# Patient Record
Sex: FEMALE | Race: WHITE | NOT HISPANIC OR LATINO | Employment: OTHER | URBAN - METROPOLITAN AREA
[De-identification: names, ages, dates, MRNs, and addresses within clinical notes are randomized per-mention and may not be internally consistent; named-entity substitution may affect disease eponyms.]

---

## 2023-09-07 PROBLEM — H35.3230 BILATERAL EXUDATIVE AGE-RELATED MACULAR DEGENERATION (MULTI): Status: ACTIVE | Noted: 2023-09-07

## 2023-09-07 PROBLEM — M81.0 AGE-RELATED OSTEOPOROSIS WITHOUT CURRENT PATHOLOGICAL FRACTURE: Status: ACTIVE | Noted: 2023-09-07

## 2023-09-07 PROBLEM — K21.9 GASTROESOPHAGEAL REFLUX DISEASE WITHOUT ESOPHAGITIS: Status: ACTIVE | Noted: 2023-09-07

## 2023-09-07 PROBLEM — R73.03 PREDIABETES: Status: ACTIVE | Noted: 2023-09-07

## 2023-09-07 PROBLEM — I87.2 VENOUS INSUFFICIENCY: Status: ACTIVE | Noted: 2023-09-07

## 2023-09-07 PROBLEM — I50.9 CONGESTIVE HEART FAILURE (MULTI): Status: ACTIVE | Noted: 2023-09-07

## 2023-09-07 PROBLEM — Z99.2 DEPENDENCE ON RENAL DIALYSIS (CMS-HCC): Status: ACTIVE | Noted: 2023-09-07

## 2023-09-07 PROBLEM — E55.9 VITAMIN D DEFICIENCY: Status: ACTIVE | Noted: 2023-09-07

## 2023-09-07 PROBLEM — F41.9 ANXIETY: Status: ACTIVE | Noted: 2023-09-07

## 2023-09-07 PROBLEM — I10 BENIGN ESSENTIAL HYPERTENSION: Status: ACTIVE | Noted: 2023-09-07

## 2023-09-07 PROBLEM — M16.0 PRIMARY OSTEOARTHRITIS OF BOTH HIPS: Status: ACTIVE | Noted: 2023-09-07

## 2023-09-07 PROBLEM — D69.1 PLATELET DISORDER (MULTI): Status: ACTIVE | Noted: 2023-09-07

## 2023-09-07 PROBLEM — N18.9 ANEMIA SECONDARY TO RENAL FAILURE: Status: ACTIVE | Noted: 2023-09-07

## 2023-09-07 PROBLEM — D69.6 ACQUIRED THROMBOCYTOPENIA (CMS-HCC): Status: ACTIVE | Noted: 2023-09-07

## 2023-09-07 PROBLEM — D63.1 ANEMIA SECONDARY TO RENAL FAILURE: Status: ACTIVE | Noted: 2023-09-07

## 2023-09-07 PROBLEM — I25.10 CORONARY ARTERY DISEASE INVOLVING NATIVE CORONARY ARTERY OF NATIVE HEART WITHOUT ANGINA PECTORIS: Status: ACTIVE | Noted: 2023-09-07

## 2023-09-07 PROBLEM — E78.00 HYPERCHOLESTEROLEMIA: Status: ACTIVE | Noted: 2023-09-07

## 2023-09-07 PROBLEM — E66.9 OBESITY WITH BODY MASS INDEX 30 OR GREATER: Status: ACTIVE | Noted: 2023-09-07

## 2023-09-07 PROBLEM — I48.20 CHRONIC ATRIAL FIBRILLATION (MULTI): Status: ACTIVE | Noted: 2023-09-07

## 2023-09-07 PROBLEM — F32.A DEPRESSION: Status: ACTIVE | Noted: 2023-09-07

## 2023-09-07 PROBLEM — G47.33 OBSTRUCTIVE SLEEP APNEA SYNDROME: Status: ACTIVE | Noted: 2023-09-07

## 2023-09-07 PROBLEM — E03.9 ACQUIRED HYPOTHYROIDISM: Status: ACTIVE | Noted: 2023-09-07

## 2023-09-07 PROBLEM — J44.9 CHRONIC OBSTRUCTIVE LUNG DISEASE (MULTI): Status: ACTIVE | Noted: 2023-09-07

## 2023-09-07 PROBLEM — M10.9 ACUTE GOUT OF RIGHT FOOT: Status: ACTIVE | Noted: 2023-09-07

## 2023-09-07 PROBLEM — E78.5 DYSLIPIDEMIA: Status: ACTIVE | Noted: 2023-09-07

## 2023-09-07 PROBLEM — I71.40 ABDOMINAL AORTIC ANEURYSM (AAA) WITHOUT RUPTURE (CMS-HCC): Status: ACTIVE | Noted: 2023-09-07

## 2023-09-07 RX ORDER — AMIODARONE HYDROCHLORIDE 200 MG/1
200 TABLET ORAL DAILY
COMMUNITY
End: 2023-10-30

## 2023-09-07 RX ORDER — PRAVASTATIN SODIUM 10 MG/1
TABLET ORAL
COMMUNITY
Start: 2016-06-06 | End: 2023-11-29 | Stop reason: SDUPTHER

## 2023-09-07 RX ORDER — MULTIVITAMIN/IRON/FOLIC ACID 18MG-0.4MG
TABLET ORAL
COMMUNITY
End: 2023-11-29 | Stop reason: ALTCHOICE

## 2023-09-07 RX ORDER — LEVOTHYROXINE SODIUM 125 UG/1
112 TABLET ORAL
COMMUNITY
End: 2024-01-09 | Stop reason: WASHOUT

## 2023-09-07 RX ORDER — AMLODIPINE BESYLATE 5 MG/1
5 TABLET ORAL DAILY
COMMUNITY
End: 2023-11-29 | Stop reason: ALTCHOICE

## 2023-09-07 RX ORDER — FUROSEMIDE 20 MG/1
20 TABLET ORAL DAILY
COMMUNITY
Start: 2022-05-27 | End: 2023-11-29 | Stop reason: SINTOL

## 2023-09-07 RX ORDER — METOPROLOL SUCCINATE 25 MG/1
TABLET, EXTENDED RELEASE ORAL
COMMUNITY
Start: 2016-07-01 | End: 2023-11-29 | Stop reason: SDUPTHER

## 2023-09-07 RX ORDER — TRIAMTERENE/HYDROCHLOROTHIAZID 37.5-25 MG
TABLET ORAL
COMMUNITY
Start: 2016-03-10 | End: 2023-11-29 | Stop reason: SINTOL

## 2023-09-07 RX ORDER — GABAPENTIN 100 MG/1
100 CAPSULE ORAL NIGHTLY
COMMUNITY
Start: 2016-09-24 | End: 2023-11-29

## 2023-09-07 RX ORDER — ATORVASTATIN CALCIUM 80 MG/1
80 TABLET, FILM COATED ORAL DAILY
COMMUNITY
Start: 2021-10-12 | End: 2023-11-29 | Stop reason: ENTERED-IN-ERROR

## 2023-09-07 RX ORDER — CHOLECALCIFEROL (VITAMIN D3) 50 MCG
2000 TABLET ORAL DAILY
COMMUNITY
End: 2023-11-29 | Stop reason: ALTCHOICE

## 2023-09-07 RX ORDER — PROMETHAZINE HYDROCHLORIDE 12.5 MG/1
12.5 TABLET ORAL EVERY 6 HOURS PRN
COMMUNITY
End: 2023-11-29 | Stop reason: ALTCHOICE

## 2023-09-07 RX ORDER — UMECLIDINIUM 62.5 UG/1
1 AEROSOL, POWDER ORAL DAILY
COMMUNITY
End: 2023-11-29 | Stop reason: ALTCHOICE

## 2023-09-07 RX ORDER — WARFARIN 2.5 MG/1
TABLET ORAL
COMMUNITY
End: 2023-11-29

## 2023-09-07 RX ORDER — METOPROLOL TARTRATE 50 MG/1
50 TABLET ORAL 2 TIMES DAILY
COMMUNITY
Start: 2023-02-05 | End: 2024-02-22 | Stop reason: SDUPTHER

## 2023-09-07 RX ORDER — ATORVASTATIN CALCIUM 40 MG/1
40 TABLET, FILM COATED ORAL DAILY
COMMUNITY
End: 2023-11-29 | Stop reason: ENTERED-IN-ERROR

## 2023-09-07 RX ORDER — NAPROXEN SODIUM 220 MG/1
81 TABLET, FILM COATED ORAL DAILY
COMMUNITY
End: 2023-11-29 | Stop reason: ENTERED-IN-ERROR

## 2023-09-07 RX ORDER — SERTRALINE HYDROCHLORIDE 25 MG/1
25 TABLET, FILM COATED ORAL
COMMUNITY
Start: 2016-06-06 | End: 2023-11-29 | Stop reason: ALTCHOICE

## 2023-09-07 RX ORDER — LEVOTHYROXINE SODIUM 50 UG/1
TABLET ORAL
COMMUNITY
Start: 2016-06-06 | End: 2023-11-29

## 2023-09-07 RX ORDER — ATENOLOL 50 MG/1
TABLET ORAL
COMMUNITY
Start: 2023-01-08 | End: 2023-11-29 | Stop reason: ENTERED-IN-ERROR

## 2023-09-07 RX ORDER — LISINOPRIL 5 MG/1
5 TABLET ORAL DAILY
COMMUNITY
Start: 2016-08-25 | End: 2023-11-29 | Stop reason: SINTOL

## 2023-09-19 ENCOUNTER — DOCUMENTATION (OUTPATIENT)
Dept: CARE COORDINATION | Facility: CLINIC | Age: 83
End: 2023-09-19
Payer: MEDICARE

## 2023-09-22 ENCOUNTER — PATIENT OUTREACH (OUTPATIENT)
Dept: CARE COORDINATION | Facility: CLINIC | Age: 83
End: 2023-09-22
Payer: MEDICARE

## 2023-10-02 ENCOUNTER — DOCUMENTATION (OUTPATIENT)
Dept: PRIMARY CARE | Facility: CLINIC | Age: 83
End: 2023-10-02
Payer: MEDICARE

## 2023-10-02 NOTE — PROGRESS NOTES
"On 9/22/23: My note: \"email from ROB HERRERA: \"Sruthi,         I spoke with Nicki, and she mentioned that maybe you or Yumiko are working on getting her mother on the Medicaid waiver. Does that sound correct to you? I don't want to duplicate efforts, so I told Nicki I would check with you first, because if you aren't already looking into this for her mom, I'd like to get Nicki to connect with an elder law  I know who specializes in getting someone on the waiver.         Thanks,         Rob\"         My Response: \"Hi Rob,         I only connect patient's to resources.         I am not getting her connected to Medicaid Waiver at this time. I do however refer to to an agency such as [facility] which has Medicaid Waiver.         When I refer to something such as [employer]...I allow that agency to assist with Medicaid Waiver.         I did reach out to [employer] at one point for this patient. I am not certain if they assessed her for the Medicaid Waiver Services in the home. All I do is simply refer the patient to that agency.         I believe what you have in mind is very helpful.         Keep me posted and thank you so much for your assistance at this time with this patient. \"         Sruthi Franco MSW LSW.\"      Above is this conversation between this SW and Rob HERRERA/[facility].   Rob HERRERA is assisting this patient in connecting with community resource currently.  Rob works for [facility] and is a community resource for this SW to refer.      "

## 2023-10-04 ENCOUNTER — HOSPITAL ENCOUNTER (EMERGENCY)
Facility: HOSPITAL | Age: 83
Discharge: HOME | End: 2023-10-04
Attending: EMERGENCY MEDICINE
Payer: MEDICARE

## 2023-10-04 ENCOUNTER — APPOINTMENT (OUTPATIENT)
Dept: RADIOLOGY | Facility: HOSPITAL | Age: 83
End: 2023-10-04
Payer: MEDICARE

## 2023-10-04 VITALS
HEIGHT: 62 IN | HEART RATE: 90 BPM | WEIGHT: 177 LBS | SYSTOLIC BLOOD PRESSURE: 158 MMHG | OXYGEN SATURATION: 99 % | RESPIRATION RATE: 17 BRPM | TEMPERATURE: 97.5 F | DIASTOLIC BLOOD PRESSURE: 80 MMHG | BODY MASS INDEX: 32.57 KG/M2

## 2023-10-04 DIAGNOSIS — R25.1 TREMOR: Primary | ICD-10-CM

## 2023-10-04 LAB
ALBUMIN SERPL-MCNC: 3.4 G/DL (ref 3.5–5)
ALP BLD-CCNC: 69 U/L (ref 35–125)
ALT SERPL-CCNC: 21 U/L (ref 5–40)
ANION GAP SERPL CALC-SCNC: 9 MMOL/L
APPEARANCE UR: ABNORMAL
AST SERPL-CCNC: 32 U/L (ref 5–40)
BACTERIA #/AREA URNS AUTO: ABNORMAL /HPF
BASOPHILS # BLD AUTO: 0.08 X10*3/UL (ref 0–0.1)
BASOPHILS NFR BLD AUTO: 1.4 %
BILIRUB SERPL-MCNC: 0.6 MG/DL (ref 0.1–1.2)
BILIRUB UR STRIP.AUTO-MCNC: NEGATIVE MG/DL
BUN SERPL-MCNC: 25 MG/DL (ref 8–25)
CALCIUM SERPL-MCNC: 9.1 MG/DL (ref 8.5–10.4)
CHLORIDE SERPL-SCNC: 98 MMOL/L (ref 97–107)
CO2 SERPL-SCNC: 26 MMOL/L (ref 24–31)
COLOR UR: YELLOW
CREAT SERPL-MCNC: 5 MG/DL (ref 0.4–1.6)
EOSINOPHIL # BLD AUTO: 0.29 X10*3/UL (ref 0–0.4)
EOSINOPHIL NFR BLD AUTO: 5 %
ERYTHROCYTE [DISTWIDTH] IN BLOOD BY AUTOMATED COUNT: 15.1 % (ref 11.5–14.5)
GFR SERPL CREATININE-BSD FRML MDRD: 8 ML/MIN/1.73M*2
GLUCOSE SERPL-MCNC: 82 MG/DL (ref 65–99)
GLUCOSE UR STRIP.AUTO-MCNC: NORMAL MG/DL
HCT VFR BLD AUTO: 36.5 % (ref 36–46)
HGB BLD-MCNC: 11.7 G/DL (ref 12–16)
IMM GRANULOCYTES # BLD AUTO: 0.01 X10*3/UL (ref 0–0.5)
IMM GRANULOCYTES NFR BLD AUTO: 0.2 % (ref 0–0.9)
KETONES UR STRIP.AUTO-MCNC: ABNORMAL MG/DL
LEUKOCYTE ESTERASE UR QL STRIP.AUTO: ABNORMAL
LYMPHOCYTES # BLD AUTO: 2.05 X10*3/UL (ref 0.8–3)
LYMPHOCYTES NFR BLD AUTO: 35.7 %
MCH RBC QN AUTO: 29.5 PG (ref 26–34)
MCHC RBC AUTO-ENTMCNC: 32.1 G/DL (ref 32–36)
MCV RBC AUTO: 92 FL (ref 80–100)
MONOCYTES # BLD AUTO: 0.31 X10*3/UL (ref 0.05–0.8)
MONOCYTES NFR BLD AUTO: 5.4 %
NEUTROPHILS # BLD AUTO: 3.01 X10*3/UL (ref 1.6–5.5)
NEUTROPHILS NFR BLD AUTO: 52.3 %
NITRITE UR QL STRIP.AUTO: NEGATIVE
NRBC BLD-RTO: 0 /100 WBCS (ref 0–0)
PH UR STRIP.AUTO: 7 [PH]
PLATELET # BLD AUTO: 57 X10*3/UL (ref 150–450)
PMV BLD AUTO: 11.8 FL (ref 7.5–11.5)
POTASSIUM SERPL-SCNC: 4.7 MMOL/L (ref 3.4–5.1)
PROT SERPL-MCNC: 6.9 G/DL (ref 5.9–7.9)
PROT UR STRIP.AUTO-MCNC: ABNORMAL MG/DL
RBC # BLD AUTO: 3.96 X10*6/UL (ref 4–5.2)
RBC # UR STRIP.AUTO: ABNORMAL /UL
RBC #/AREA URNS AUTO: ABNORMAL /HPF
RBC MORPH BLD: NORMAL
SODIUM SERPL-SCNC: 133 MMOL/L (ref 133–145)
SP GR UR STRIP.AUTO: 1.01
SQUAMOUS #/AREA URNS AUTO: ABNORMAL /HPF
TARGETS BLD QL SMEAR: NORMAL
UROBILINOGEN UR STRIP.AUTO-MCNC: NORMAL MG/DL
WBC # BLD AUTO: 5.8 X10*3/UL (ref 4.4–11.3)
WBC #/AREA URNS AUTO: >50 /HPF

## 2023-10-04 PROCEDURE — 84075 ASSAY ALKALINE PHOSPHATASE: CPT

## 2023-10-04 PROCEDURE — 36415 COLL VENOUS BLD VENIPUNCTURE: CPT

## 2023-10-04 PROCEDURE — 71046 X-RAY EXAM CHEST 2 VIEWS: CPT

## 2023-10-04 PROCEDURE — 81001 URINALYSIS AUTO W/SCOPE: CPT

## 2023-10-04 PROCEDURE — 99284 EMERGENCY DEPT VISIT MOD MDM: CPT | Mod: 25 | Performed by: EMERGENCY MEDICINE

## 2023-10-04 PROCEDURE — 85025 COMPLETE CBC W/AUTO DIFF WBC: CPT

## 2023-10-04 ASSESSMENT — PAIN DESCRIPTION - FREQUENCY: FREQUENCY: CONSTANT/CONTINUOUS

## 2023-10-04 ASSESSMENT — COLUMBIA-SUICIDE SEVERITY RATING SCALE - C-SSRS
2. HAVE YOU ACTUALLY HAD ANY THOUGHTS OF KILLING YOURSELF?: NO
1. IN THE PAST MONTH, HAVE YOU WISHED YOU WERE DEAD OR WISHED YOU COULD GO TO SLEEP AND NOT WAKE UP?: NO
6. HAVE YOU EVER DONE ANYTHING, STARTED TO DO ANYTHING, OR PREPARED TO DO ANYTHING TO END YOUR LIFE?: NO

## 2023-10-04 ASSESSMENT — PAIN SCALES - GENERAL: PAINLEVEL_OUTOF10: 7

## 2023-10-04 ASSESSMENT — LIFESTYLE VARIABLES
EVER HAD A DRINK FIRST THING IN THE MORNING TO STEADY YOUR NERVES TO GET RID OF A HANGOVER: NO
EVER FELT BAD OR GUILTY ABOUT YOUR DRINKING: NO
HAVE YOU EVER FELT YOU SHOULD CUT DOWN ON YOUR DRINKING: NO
HAVE PEOPLE ANNOYED YOU BY CRITICIZING YOUR DRINKING: NO

## 2023-10-04 ASSESSMENT — PAIN - FUNCTIONAL ASSESSMENT
PAIN_FUNCTIONAL_ASSESSMENT: 0-10
PAIN_FUNCTIONAL_ASSESSMENT: 0-10

## 2023-10-04 ASSESSMENT — PAIN DESCRIPTION - DESCRIPTORS: DESCRIPTORS: ACHING

## 2023-10-04 ASSESSMENT — PAIN DESCRIPTION - LOCATION: LOCATION: LEG

## 2023-10-04 NOTE — ED NOTES
Vitals updated. Pt updated. [place] given. No needs at this time     Leticia Culver, EMT  10/04/23 7756

## 2023-10-04 NOTE — ED PROVIDER NOTES
HPI   Chief Complaint   Patient presents with    Tremors     Tremors in both lower ext starting this morning. PT has upper body tremors chronically.        Patient is an 82-year-old female presents emergency department for evaluation of tremors and generalized weakness.  Patient states that for the last 4 months she has had tremors in the upper extremity and has seen her primary care doctor for this and was told they were benign.  She states that starting today she has felt tremulous in the lower extremities as well and generally weak.  She is nonambulatory at baseline and uses a walker to get around.  Her main complaint today is just feeling generally fatigued and worn out.  She does note that she is a dialysis patient with dialysis on Mondays, Wednesdays, and Fridays missing her dialysis today to come here for her general fatigue. she denies any specific complaints at this time of any pain, chest pain, shortness of breath, nausea, vomiting, fevers, chills, or other symptoms at this time.                          Lizeth Coma Scale Score: 15                  Patient History   History reviewed. No pertinent past medical history.  History reviewed. No pertinent surgical history.  Family History   Problem Relation Name Age of Onset    Heart disease Mother      Diabetes Mother      Emphysema Father      COPD Father       Social History     Tobacco Use    Smoking status: Not on file    Smokeless tobacco: Not on file   Substance Use Topics    Alcohol use: Not on file    Drug use: Not on file       Physical Exam   ED Triage Vitals [10/04/23 1059]   Temp Heart Rate Resp BP   36.4 °C (97.5 °F) 61 16 --      SpO2 Temp Source Heart Rate Source Patient Position   92 % Oral Monitor --      BP Location FiO2 (%)     -- --       Physical Exam  Constitutional:       Appearance: Normal appearance.   HENT:      Head: Normocephalic and atraumatic.   Eyes:      Extraocular Movements: Extraocular movements intact.      Pupils: Pupils  are equal, round, and reactive to light.   Cardiovascular:      Rate and Rhythm: Normal rate and regular rhythm.   Pulmonary:      Effort: Pulmonary effort is normal.      Breath sounds: Normal breath sounds.   Abdominal:      General: Abdomen is flat.      Palpations: Abdomen is soft.   Musculoskeletal:         General: Normal range of motion.      Cervical back: Normal range of motion and neck supple.   Skin:     General: Skin is warm and dry.   Neurological:      General: No focal deficit present.      Mental Status: She is alert and oriented to person, place, and time.         ED Course & MDM   Diagnoses as of 10/04/23 1906   Tremor       Medical Decision Making  Patient is an 82-year-old female presents emergency department for evaluation of generalized fatigue and tremors.    EKG was interpreted by attending physician.    Lab work done today included CMP, CBC, urinalysis.  Lab work without significant abnormality.    Scans done today were interpreted/confirmed by radiologist and also interpreted by me which included chest x-ray.  Chest x-ray shows no acute cardiopulmonary disease.    Medications given at today's visit.    I saw this patient in conjunction with Dr. Kaiser.  Patient had no appreciable tremors or weakness noted on physical exam.  Lab work without significant abnormality and unclear etiology of patient's symptoms.  Patient is currently at her baseline able to take care of himself at home.  She is most concerned with her tremors which have been ongoing for the last several months.  She is already following with her primary care provider and has been told that these are benign essential tremors and did not require any further intervention.  Given her concern she will be given referral to neurology for outpatient follow-up.  Given unremarkable work-up will discharge to follow-up close with primary care provider outpatient.  Emergent pathologies were considered for this patient, although I have low  suspicion for anything acutely emergent given patient's clinical presentation, history, physical exam, stable vital signs, and relatively unremarkable workup.  Discharging patient home is reasonable plan of care for outpatient management.    All labs, imaging, and diagnostic studies were reviewed by me and patient was counseled on clinical impression, expectations, and plan.  Patient was educated to follow-up with PCP in the following 1-2 days.  All questions from patient were answered. They elicited understanding and were agreeable to course of treatment.  Patient was discharged in stable condition and given strict return precautions.    ** Disclaimer:  Parts of this document were written utilizing a voice to text dictation software.  Note may contain minor transcription or typographical errors that were inadvertently transcribed by the computer software.        For complete history of present illness, review of systems, and physical exam information please see the mid-level provider full encounter documentation.    Patient is an 82-year-old female presenting with complaints of intermittent tremors.  Has been ongoing for months and she has seen her primary care physician for same.  He reports her feet seem to be getting involved in the tremulousness now.  Physical exam was unremarkable, patient had concern for a minimal tremor in her right index finger during my examination.  Work-up was unrevealing for an etiology.  Patient will be discharged with follow-up back with her primary care physician.    I personally saw the patient and performed a substantive portion of the visit including all aspects of the medical decision making.    Procedure  Procedures     Emi Navas PA-C  10/04/23 1905       Gama Kaiser MD  10/05/23 9110

## 2023-10-09 ENCOUNTER — HOSPITAL ENCOUNTER (OUTPATIENT)
Dept: CARDIOLOGY | Facility: CLINIC | Age: 83
Discharge: HOME | End: 2023-10-09
Payer: MEDICARE

## 2023-10-09 ENCOUNTER — APPOINTMENT (OUTPATIENT)
Dept: CARDIOLOGY | Facility: CLINIC | Age: 83
End: 2023-10-09
Payer: MEDICARE

## 2023-10-09 PROCEDURE — 93296 REM INTERROG EVL PM/IDS: CPT

## 2023-10-09 PROCEDURE — 93294 REM INTERROG EVL PM/LDLS PM: CPT | Performed by: INTERNAL MEDICINE

## 2023-10-13 ENCOUNTER — DOCUMENTATION (OUTPATIENT)
Dept: CARE COORDINATION | Facility: CLINIC | Age: 83
End: 2023-10-13
Payer: MEDICARE

## 2023-10-13 NOTE — PROGRESS NOTES
"Email from Tomasz HERRERA/. [facility]:  \"I've been leaving voicemails and texts for Nicki to discuss where things stand with the Medicaid waiver application for her mother, but I haven't heard back since September 29th.  I'm going to keep trying, but I wanted to give you a heads up\"    This SW will keep in contact with this resource on any updates.  Sruthi Franco MSW LSW   "

## 2023-10-18 ENCOUNTER — HOSPITAL ENCOUNTER (OUTPATIENT)
Dept: CARDIOLOGY | Facility: HOSPITAL | Age: 83
Discharge: HOME | End: 2023-10-18
Payer: MEDICARE

## 2023-10-18 LAB
ATRIAL RATE: 500 BPM
Q ONSET: 225 MS
QRS COUNT: 10 BEATS
QRS DURATION: 142 MS
QT INTERVAL: 522 MS
QTC CALCULATION(BAZETT): 522 MS
QTC FREDERICIA: 522 MS
R AXIS: 19 DEGREES
T AXIS: -3 DEGREES
T OFFSET: 486 MS
VENTRICULAR RATE: 60 BPM

## 2023-10-18 PROCEDURE — 93005 ELECTROCARDIOGRAM TRACING: CPT

## 2023-10-26 DIAGNOSIS — I48.0 PAROXYSMAL ATRIAL FIBRILLATION (MULTI): Primary | ICD-10-CM

## 2023-10-30 RX ORDER — AMIODARONE HYDROCHLORIDE 200 MG/1
200 TABLET ORAL DAILY
Qty: 90 TABLET | Refills: 0 | Status: SHIPPED | OUTPATIENT
Start: 2023-10-30 | End: 2023-10-31 | Stop reason: SDUPTHER

## 2023-10-31 DIAGNOSIS — I48.0 PAROXYSMAL ATRIAL FIBRILLATION (MULTI): ICD-10-CM

## 2023-10-31 RX ORDER — AMIODARONE HYDROCHLORIDE 200 MG/1
200 TABLET ORAL DAILY
Qty: 90 TABLET | Refills: 0 | Status: SHIPPED | OUTPATIENT
Start: 2023-10-31 | End: 2024-02-22 | Stop reason: SDUPTHER

## 2023-11-02 ENCOUNTER — DOCUMENTATION (OUTPATIENT)
Dept: CARE COORDINATION | Facility: CLINIC | Age: 83
End: 2023-11-02
Payer: MEDICARE

## 2023-11-02 NOTE — PROGRESS NOTES
This SW did call Nicki @ [phone]/daughter of the patient for an update.  This SW has left a message and provided new contact number too for this .   I am waiting for a return call from patient's daughter for any updates at this time.

## 2023-11-06 ENCOUNTER — DOCUMENTATION (OUTPATIENT)
Dept: PRIMARY CARE | Facility: CLINIC | Age: 83
End: 2023-11-06
Payer: MEDICARE

## 2023-11-06 DIAGNOSIS — I50.9 CONGESTIVE HEART FAILURE, UNSPECIFIED HF CHRONICITY, UNSPECIFIED HEART FAILURE TYPE (MULTI): ICD-10-CM

## 2023-11-06 DIAGNOSIS — N18.6 CHRONIC KIDNEY DISEASE WITH END STAGE RENAL FAILURE ON DIALYSIS (MULTI): ICD-10-CM

## 2023-11-06 DIAGNOSIS — Z99.2 CHRONIC KIDNEY DISEASE WITH END STAGE RENAL FAILURE ON DIALYSIS (MULTI): ICD-10-CM

## 2023-11-06 NOTE — PROGRESS NOTES
Attempted to reach patient's daughter Nicki Man for monthly outreach & left voicemail message with request to return my call. Patient being followed by Social Work as well. Will await response & attempt to contact within the week.

## 2023-11-07 NOTE — PROGRESS NOTES
Daughter Nicki returned my call & states has appointment with [facility] on Thursday, 11/9 at 1:30 pm for additional resources. Has not been successful getting Noemi qualified for Medicaid due to monthly income & has name of  to assist with Medicaid eligibility. Refer to notes from  Sruthi Franco for additional information. Interested in getting hospital bed, wheelchair & other assistive devices. Nicki to contact this  after Hospice visit to provide update. Patient continues to receive dialysis 3x/week at [facility]. Needs assistance with ADL's & this  will provide community referrals available as needed. Per Nicki, goal is to keep mom at home if possible as long as it is safe. Daughter does work outside the house & mom is alone for short periods of time. Will await update from daughter after Hospice visit.

## 2023-11-10 ENCOUNTER — TELEPHONE (OUTPATIENT)
Dept: PRIMARY CARE | Facility: CLINIC | Age: 83
End: 2023-11-10
Payer: MEDICARE

## 2023-11-10 NOTE — TELEPHONE ENCOUNTER
Spoke with Nicki. Agreed with PCP recommendation and will getting working on getting her to ER for geropsych eval and long term care placement

## 2023-11-10 NOTE — TELEPHONE ENCOUNTER
LVM for Nicki to call the office to discuss what PCP recommends. Did not want to leave this on a voicemail.

## 2023-11-10 NOTE — TELEPHONE ENCOUNTER
Patient's daughter Nicki called regarding some behavorial issues that the patient is having. Had initially asked about evaluation for stroke or dementia. States that patient is having increased issues with anger, calling 911 with no real concerns happening. States she can not keep taking care of her at home, patient not getting out of bed, only doing dialysis when she feels like it. Asking for what PCP could recommended for getting the patient help. Call back # [phone].

## 2023-11-13 ENCOUNTER — DOCUMENTATION (OUTPATIENT)
Dept: CARE COORDINATION | Facility: CLINIC | Age: 83
End: 2023-11-13
Payer: MEDICARE

## 2023-11-13 ENCOUNTER — OFFICE VISIT (OUTPATIENT)
Dept: VASCULAR SURGERY | Facility: CLINIC | Age: 83
End: 2023-11-13
Payer: MEDICARE

## 2023-11-13 VITALS — SYSTOLIC BLOOD PRESSURE: 128 MMHG | HEART RATE: 72 BPM | DIASTOLIC BLOOD PRESSURE: 56 MMHG

## 2023-11-13 DIAGNOSIS — N18.6 CHRONIC KIDNEY DISEASE WITH END STAGE RENAL DISEASE ON DIALYSIS DUE TO TYPE 2 DIABETES MELLITUS (MULTI): ICD-10-CM

## 2023-11-13 DIAGNOSIS — E11.22 CHRONIC KIDNEY DISEASE WITH END STAGE RENAL DISEASE ON DIALYSIS DUE TO TYPE 2 DIABETES MELLITUS (MULTI): ICD-10-CM

## 2023-11-13 DIAGNOSIS — I77.89 ARTERIAL STEAL SYNDROME (CMS-HCC): Primary | ICD-10-CM

## 2023-11-13 DIAGNOSIS — R09.89 OTHER SPECIFIED SYMPTOMS AND SIGNS INVOLVING THE CIRCULATORY AND RESPIRATORY SYSTEMS: ICD-10-CM

## 2023-11-13 DIAGNOSIS — N18.6 END STAGE KIDNEY DISEASE (MULTI): ICD-10-CM

## 2023-11-13 DIAGNOSIS — Z99.2 CHRONIC KIDNEY DISEASE WITH END STAGE RENAL DISEASE ON DIALYSIS DUE TO TYPE 2 DIABETES MELLITUS (MULTI): ICD-10-CM

## 2023-11-13 PROCEDURE — 1159F MED LIST DOCD IN RCRD: CPT | Performed by: NURSE PRACTITIONER

## 2023-11-13 PROCEDURE — 3074F SYST BP LT 130 MM HG: CPT | Performed by: NURSE PRACTITIONER

## 2023-11-13 PROCEDURE — 99213 OFFICE O/P EST LOW 20 MIN: CPT | Performed by: NURSE PRACTITIONER

## 2023-11-13 PROCEDURE — 1160F RVW MEDS BY RX/DR IN RCRD: CPT | Performed by: NURSE PRACTITIONER

## 2023-11-13 PROCEDURE — 99203 OFFICE O/P NEW LOW 30 MIN: CPT | Performed by: NURSE PRACTITIONER

## 2023-11-13 PROCEDURE — 3078F DIAST BP <80 MM HG: CPT | Performed by: NURSE PRACTITIONER

## 2023-11-13 PROCEDURE — 1125F AMNT PAIN NOTED PAIN PRSNT: CPT | Performed by: NURSE PRACTITIONER

## 2023-11-13 ASSESSMENT — PATIENT HEALTH QUESTIONNAIRE - PHQ9
1. LITTLE INTEREST OR PLEASURE IN DOING THINGS: NOT AT ALL
2. FEELING DOWN, DEPRESSED OR HOPELESS: NOT AT ALL
SUM OF ALL RESPONSES TO PHQ9 QUESTIONS 1 & 2: 0

## 2023-11-13 ASSESSMENT — COLUMBIA-SUICIDE SEVERITY RATING SCALE - C-SSRS
1. IN THE PAST MONTH, HAVE YOU WISHED YOU WERE DEAD OR WISHED YOU COULD GO TO SLEEP AND NOT WAKE UP?: NO
2. HAVE YOU ACTUALLY HAD ANY THOUGHTS OF KILLING YOURSELF?: NO
6. HAVE YOU EVER DONE ANYTHING, STARTED TO DO ANYTHING, OR PREPARED TO DO ANYTHING TO END YOUR LIFE?: NO

## 2023-11-13 ASSESSMENT — LIFESTYLE VARIABLES
SKIP TO QUESTIONS 9-10: 1
HOW OFTEN DO YOU HAVE A DRINK CONTAINING ALCOHOL: NEVER
HOW OFTEN DO YOU HAVE SIX OR MORE DRINKS ON ONE OCCASION: NEVER
AUDIT-C TOTAL SCORE: 0
HOW MANY STANDARD DRINKS CONTAINING ALCOHOL DO YOU HAVE ON A TYPICAL DAY: PATIENT DOES NOT DRINK

## 2023-11-13 ASSESSMENT — ENCOUNTER SYMPTOMS
LOSS OF SENSATION IN FEET: 0
DEPRESSION: 1
OCCASIONAL FEELINGS OF UNSTEADINESS: 1

## 2023-11-13 ASSESSMENT — PAIN SCALES - GENERAL: PAINLEVEL: 8

## 2023-11-13 NOTE — CARE PLAN
This SW needs to speak with daughter and or patient for update on care plan and goals.   Problem: Coordination of Community Resources Needed  Goal: Coordination of Services will be Obtained  Outcome: Not met  Intervention: Coordinate care to local community resources

## 2023-11-13 NOTE — PROGRESS NOTES
This SW spoke with LPN/Yumiko Smith today regarding this patient/Noemi. This SW has discussed possible level of care assessment option that can be completed by [facility] due to LPN explaining that the daughter/Nicki may want to place this patient. Nicki's number is: [phone]. At this time, this SW has left Nicki a message to discuss any options that could assist this patient and daughter.   NOTE: this SW also reviewed Pt's chart.

## 2023-11-16 NOTE — PROGRESS NOTES
"History Of Present Illness  Noemi Man is a 82 y.o. female presenting for evaluation of her left brachiobasilic arteriovenous fistula.  She initially had the fistula created January 23, 2023 by Dr. Varela.  The fistula was then transposed 4/19/2023.  The patient states that they attempted to utilize the fistula 1 time and it \"blood all over\".  Since then, they have not attempted to access the fistula again.  She is getting dialysis through a tunneled chest permacatheter.  Additionally, the patient notes symptoms of left hand pain and numbness.  States the symptoms have been getting worse.     Past Medical History  Patient Active Problem List    Diagnosis Date Noted    End stage renal disease (CMS/HCC) 11/13/2023    Acquired hypothyroidism 09/07/2023    Acquired thrombocytopenia (CMS/[facility]) 09/07/2023    Acute gout of right foot 09/07/2023    Chronic obstructive lung disease (CMS/[facility]) 09/07/2023    Age-related osteoporosis without current pathological fracture 09/07/2023    Abdominal aortic aneurysm (AAA) without rupture (CMS/HCC) 09/07/2023    Anxiety 09/07/2023    Congestive heart failure (CMS/[facility]) 09/07/2023    Coronary artery disease involving native coronary artery of native heart without angina pectoris 09/07/2023    Dependence on renal dialysis (CMS/[facility]) 09/07/2023    Dyslipidemia 09/07/2023    Benign essential hypertension 09/07/2023    Bilateral exudative age-related macular degeneration (CMS/[facility]) 09/07/2023    Gastroesophageal reflux disease without esophagitis 09/07/2023    Hypercholesterolemia 09/07/2023    Obesity with body mass index 30 or greater 09/07/2023    Chronic atrial fibrillation (CMS/[facility]) 09/07/2023    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       Social History  She reports that she has been smoking cigarettes. She has never used smokeless tobacco. She reports that she does not drink alcohol and does not use drugs.    Family History  Family History   Problem Relation Name Age of Onset    Heart disease Mother      Diabetes Mother      Emphysema Father      COPD Father          Allergies  Amoxicillin, Azithromycin, Lisinopril, Amlodipine besylate, Ampicillin, Carvedilol, and Pravastatin sodium    Review of Systems  CONSTITUTIONAL: Denies weight loss, fever and chills.    HEENT: Denies changes in vision and hearing.    RESPIRATORY: Denies SOB and cough.    CV: Denies palpitations and CP.    GI: Denies abdominal pain, nausea, vomiting and diarrhea.    : Denies dysuria and urinary frequency.    MSK: Denies myalgia and joint pain.    SKIN: Denies rash and pruritus.    VASC: Denies claudication, ischemic rest pain, or open wounds or sores.  Positive for numbness and tingling in the left hand and fingers.    NEUROLOGICAL: Denies headache and syncope.    PSYCHIATRIC: Denies recent changes in mood. Denies anxiety and depression.       Physical Exam    General: Pt is alert and oriented x 3. Pleasant, conversive  HEENT: Head is atraumatic, normocephalic. PERRL. No cervical bruits  Cardiac: Normal S1-S2.  Regular rate and rhythm.  No murmurs.  Respiratory: Lungs clear to auscultation.  No adventitious sounds.  Abdomen: Soft, nondistended, nontender.  Bowel sounds x4 quadrants.  Pulse exam: Difficult to palpate radial and ulnar pulses on the left.  And is warm to the touch and normal in color.  Extremities: Patient has a strong thrill and audible bruit left upper extremity fistula.  Neuro: Moves all extremities spontaneously.  No focal deficits.  Psych: Appropriate affect.  Answers questions appropriately.     Last Recorded Vitals  /56   Pulse 72     Relevant Results    Current Outpatient  Medications   Medication Instructions    amiodarone (PACERONE) 200 mg, oral, Daily    amLODIPine (NORVASC) 5 mg, oral, Daily    apixaban (ELIQUIS) 2.5 mg, oral, 2 times daily    aspirin 81 mg, oral, Daily    atenolol (Tenormin) 50 mg tablet     atorvastatin (LIPITOR) 40 mg, oral, Daily    atorvastatin (LIPITOR) 80 mg, oral, Daily    b complex 0.4 mg tablet as directed    cholecalciferol (VITAMIN D-3) 2,000 Units, oral, Daily    furosemide (LASIX) 20 mg, oral, Daily    gabapentin (NEURONTIN) 100 mg, oral, Nightly    levothyroxine (Synthroid, Levoxyl) 50 mcg tablet oral    levothyroxine (SYNTHROID, LEVOXYL) 125 mcg, oral, Daily before breakfast    lisinopril 5 mg, oral, Daily    metoprolol succinate XL (Toprol-XL) 25 mg 24 hr tablet oral    metoprolol tartrate (LOPRESSOR) 50 mg, oral, 2 times daily    pravastatin (Pravachol) 10 mg tablet oral    promethazine (PHENERGAN) 12.5 mg, oral, Every 6 hours PRN    sertraline (ZOLOFT) 25 mg, oral    triamterene-hydrochlorothiazid (Maxzide-25) 37.5-25 mg tablet oral    umeclidinium (Incruse Ellipta) 62.5 mcg/actuation inhalation 1 puff, inhalation, Daily    warfarin (Jantoven) 2.5 mg tablet TAKE 1 TO 2 TABLETS BY MOUTH EVERY DAY AS DIRECTED            Assessment/Plan       Complication left upper extremity arteriovenous fistula-I did call and speak to Dr. Varela's office and requested medical records.  Per conversation, the patient has a stenosis at the arterial anastomosis site.  She was scheduled for fistulogram with Dr. Varela, however, this has been delayed.  Patient is interested in proceeding with fistulogram with our practice.  She is currently getting dialysis through a tunneled chest permacath.  Possible arterial steal syndrome-would recommend testing for possible arterial steal syndrome given her symptoms.  Additionally, if there is a stenosis in the fistula, this could potentially worsen the symptoms of arterial steal syndrome.  Plan for follow-up with testing and an office  visit with Dr. Quintero.       Rcihard Joseph, JHONATHAN-CNP

## 2023-11-29 ENCOUNTER — HOSPITAL ENCOUNTER (OUTPATIENT)
Facility: HOSPITAL | Age: 83
Setting detail: OBSERVATION
Discharge: HOME | DRG: 682 | End: 2023-11-30
Attending: EMERGENCY MEDICINE | Admitting: HOSPITALIST
Payer: MEDICARE

## 2023-11-29 ENCOUNTER — APPOINTMENT (OUTPATIENT)
Dept: CARDIOLOGY | Facility: HOSPITAL | Age: 83
DRG: 682 | End: 2023-11-29
Payer: MEDICARE

## 2023-11-29 ENCOUNTER — APPOINTMENT (OUTPATIENT)
Dept: DIALYSIS | Facility: HOSPITAL | Age: 83
End: 2023-11-29
Payer: MEDICARE

## 2023-11-29 ENCOUNTER — APPOINTMENT (OUTPATIENT)
Dept: RADIOLOGY | Facility: HOSPITAL | Age: 83
DRG: 682 | End: 2023-11-29
Payer: MEDICARE

## 2023-11-29 DIAGNOSIS — N39.0 UTI (URINARY TRACT INFECTION): Primary | ICD-10-CM

## 2023-11-29 DIAGNOSIS — I48.20 CHRONIC ATRIAL FIBRILLATION (MULTI): ICD-10-CM

## 2023-11-29 PROBLEM — Z99.2 ESRD (END STAGE RENAL DISEASE) ON DIALYSIS (MULTI): Status: ACTIVE | Noted: 2023-11-13

## 2023-11-29 LAB
ALBUMIN SERPL-MCNC: 3.6 G/DL (ref 3.5–5)
ALP BLD-CCNC: 72 U/L (ref 35–125)
ALT SERPL-CCNC: 17 U/L (ref 5–40)
ANION GAP SERPL CALC-SCNC: 15 MMOL/L
APPEARANCE UR: ABNORMAL
AST SERPL-CCNC: 29 U/L (ref 5–40)
BASOPHILS # BLD AUTO: 0.09 X10*3/UL (ref 0–0.1)
BASOPHILS NFR BLD AUTO: 1.8 %
BILIRUB SERPL-MCNC: 0.6 MG/DL (ref 0.1–1.2)
BILIRUB UR STRIP.AUTO-MCNC: NEGATIVE MG/DL
BUN SERPL-MCNC: 44 MG/DL (ref 8–25)
CALCIUM SERPL-MCNC: 8.9 MG/DL (ref 8.5–10.4)
CHLORIDE SERPL-SCNC: 97 MMOL/L (ref 97–107)
CO2 SERPL-SCNC: 23 MMOL/L (ref 24–31)
COLOR UR: ABNORMAL
CREAT SERPL-MCNC: 7.9 MG/DL (ref 0.4–1.6)
EOSINOPHIL # BLD AUTO: 0.45 X10*3/UL (ref 0–0.4)
EOSINOPHIL NFR BLD AUTO: 8.9 %
ERYTHROCYTE [DISTWIDTH] IN BLOOD BY AUTOMATED COUNT: 17.2 % (ref 11.5–14.5)
FLUAV RNA RESP QL NAA+PROBE: NOT DETECTED
FLUBV RNA RESP QL NAA+PROBE: NOT DETECTED
GFR SERPL CREATININE-BSD FRML MDRD: 5 ML/MIN/1.73M*2
GLUCOSE SERPL-MCNC: 81 MG/DL (ref 65–99)
GLUCOSE UR STRIP.AUTO-MCNC: NORMAL MG/DL
HCT VFR BLD AUTO: 33.1 % (ref 36–46)
HGB BLD-MCNC: 10.5 G/DL (ref 12–16)
IMM GRANULOCYTES # BLD AUTO: 0.01 X10*3/UL (ref 0–0.5)
IMM GRANULOCYTES NFR BLD AUTO: 0.2 % (ref 0–0.9)
INR PPP: 1.2 (ref 0.9–1.2)
KETONES UR STRIP.AUTO-MCNC: NEGATIVE MG/DL
LEUKOCYTE ESTERASE UR QL STRIP.AUTO: ABNORMAL
LIPASE SERPL-CCNC: 17 U/L (ref 16–63)
LYMPHOCYTES # BLD AUTO: 1.59 X10*3/UL (ref 0.8–3)
LYMPHOCYTES NFR BLD AUTO: 31.5 %
MCH RBC QN AUTO: 29.3 PG (ref 26–34)
MCHC RBC AUTO-ENTMCNC: 31.7 G/DL (ref 32–36)
MCV RBC AUTO: 93 FL (ref 80–100)
MONOCYTES # BLD AUTO: 0.32 X10*3/UL (ref 0.05–0.8)
MONOCYTES NFR BLD AUTO: 6.3 %
NEUTROPHILS # BLD AUTO: 2.58 X10*3/UL (ref 1.6–5.5)
NEUTROPHILS NFR BLD AUTO: 51.3 %
NITRITE UR QL STRIP.AUTO: NEGATIVE
NRBC BLD-RTO: 0 /100 WBCS (ref 0–0)
NT-PROBNP SERPL-MCNC: ABNORMAL PG/ML (ref 0–624)
PH UR STRIP.AUTO: 7.5 [PH]
PLATELET # BLD AUTO: 58 X10*3/UL (ref 150–450)
POTASSIUM SERPL-SCNC: 4.9 MMOL/L (ref 3.4–5.1)
PROT SERPL-MCNC: 7.2 G/DL (ref 5.9–7.9)
PROT UR STRIP.AUTO-MCNC: ABNORMAL MG/DL
PROTHROMBIN TIME: 12.4 SECONDS (ref 9.3–12.7)
Q ONSET: 222 MS
QRS COUNT: 9 BEATS
QRS DURATION: 160 MS
QT INTERVAL: 516 MS
QTC CALCULATION(BAZETT): 516 MS
QTC FREDERICIA: 516 MS
R AXIS: 66 DEGREES
RBC # BLD AUTO: 3.58 X10*6/UL (ref 4–5.2)
RBC # UR STRIP.AUTO: ABNORMAL /UL
RBC #/AREA URNS AUTO: >20 /HPF
RBC MORPH BLD: NORMAL
SARS-COV-2 RNA RESP QL NAA+PROBE: NOT DETECTED
SODIUM SERPL-SCNC: 135 MMOL/L (ref 133–145)
SP GR UR STRIP.AUTO: 1.01
SQUAMOUS #/AREA URNS AUTO: ABNORMAL /HPF
T AXIS: 28 DEGREES
T OFFSET: 480 MS
TROPONIN T SERPL-MCNC: 102 NG/L
TROPONIN T SERPL-MCNC: 95 NG/L
TROPONIN T SERPL-MCNC: 98 NG/L
UROBILINOGEN UR STRIP.AUTO-MCNC: NORMAL MG/DL
VENTRICULAR RATE: 60 BPM
WBC # BLD AUTO: 5 X10*3/UL (ref 4.4–11.3)
WBC #/AREA URNS AUTO: >50 /HPF
WBC CLUMPS #/AREA URNS AUTO: ABNORMAL /HPF

## 2023-11-29 PROCEDURE — G0378 HOSPITAL OBSERVATION PER HR: HCPCS

## 2023-11-29 PROCEDURE — 8010000001 HC DIALYSIS - HEMODIALYSIS PER DAY

## 2023-11-29 PROCEDURE — 83880 ASSAY OF NATRIURETIC PEPTIDE: CPT | Performed by: EMERGENCY MEDICINE

## 2023-11-29 PROCEDURE — 2060000001 HC INTERMEDIATE ICU ROOM DAILY

## 2023-11-29 PROCEDURE — 85025 COMPLETE CBC W/AUTO DIFF WBC: CPT | Performed by: EMERGENCY MEDICINE

## 2023-11-29 PROCEDURE — 36415 COLL VENOUS BLD VENIPUNCTURE: CPT | Performed by: EMERGENCY MEDICINE

## 2023-11-29 PROCEDURE — 93005 ELECTROCARDIOGRAM TRACING: CPT

## 2023-11-29 PROCEDURE — 36415 COLL VENOUS BLD VENIPUNCTURE: CPT | Performed by: HOSPITALIST

## 2023-11-29 PROCEDURE — 2500000004 HC RX 250 GENERAL PHARMACY W/ HCPCS (ALT 636 FOR OP/ED): Performed by: HOSPITALIST

## 2023-11-29 PROCEDURE — 80053 COMPREHEN METABOLIC PANEL: CPT | Performed by: EMERGENCY MEDICINE

## 2023-11-29 PROCEDURE — 83690 ASSAY OF LIPASE: CPT | Performed by: EMERGENCY MEDICINE

## 2023-11-29 PROCEDURE — 2500000001 HC RX 250 WO HCPCS SELF ADMINISTERED DRUGS (ALT 637 FOR MEDICARE OP): Performed by: HOSPITALIST

## 2023-11-29 PROCEDURE — 87636 SARSCOV2 & INF A&B AMP PRB: CPT | Performed by: EMERGENCY MEDICINE

## 2023-11-29 PROCEDURE — 84484 ASSAY OF TROPONIN QUANT: CPT | Performed by: EMERGENCY MEDICINE

## 2023-11-29 PROCEDURE — 99285 EMERGENCY DEPT VISIT HI MDM: CPT | Performed by: EMERGENCY MEDICINE

## 2023-11-29 PROCEDURE — 5A1D70Z PERFORMANCE OF URINARY FILTRATION, INTERMITTENT, LESS THAN 6 HOURS PER DAY: ICD-10-PCS | Performed by: INTERNAL MEDICINE

## 2023-11-29 PROCEDURE — 71045 X-RAY EXAM CHEST 1 VIEW: CPT | Mod: FY

## 2023-11-29 PROCEDURE — 81001 URINALYSIS AUTO W/SCOPE: CPT | Performed by: EMERGENCY MEDICINE

## 2023-11-29 PROCEDURE — 85610 PROTHROMBIN TIME: CPT | Performed by: HOSPITALIST

## 2023-11-29 RX ORDER — AMIODARONE HYDROCHLORIDE 200 MG/1
200 TABLET ORAL DAILY
Status: DISCONTINUED | OUTPATIENT
Start: 2023-11-29 | End: 2023-11-30 | Stop reason: HOSPADM

## 2023-11-29 RX ORDER — LEVOFLOXACIN 5 MG/ML
250 INJECTION, SOLUTION INTRAVENOUS
Status: DISCONTINUED | OUTPATIENT
Start: 2023-11-29 | End: 2023-11-30 | Stop reason: HOSPADM

## 2023-11-29 RX ORDER — LEVOFLOXACIN 5 MG/ML
250 INJECTION, SOLUTION INTRAVENOUS ONCE
Status: DISCONTINUED | OUTPATIENT
Start: 2023-11-29 | End: 2023-11-29

## 2023-11-29 RX ORDER — ACETAMINOPHEN 325 MG/1
650 TABLET ORAL EVERY 6 HOURS PRN
Status: DISCONTINUED | OUTPATIENT
Start: 2023-11-29 | End: 2023-11-30 | Stop reason: HOSPADM

## 2023-11-29 RX ORDER — HEPARIN SODIUM 1000 [USP'U]/ML
2000 INJECTION, SOLUTION INTRAVENOUS; SUBCUTANEOUS
Status: DISCONTINUED | OUTPATIENT
Start: 2023-11-29 | End: 2023-11-30 | Stop reason: HOSPADM

## 2023-11-29 RX ORDER — ONDANSETRON HYDROCHLORIDE 2 MG/ML
4 INJECTION, SOLUTION INTRAVENOUS EVERY 6 HOURS PRN
Status: DISCONTINUED | OUTPATIENT
Start: 2023-11-29 | End: 2023-11-29

## 2023-11-29 RX ORDER — TALC
3 POWDER (GRAM) TOPICAL NIGHTLY PRN
Status: DISCONTINUED | OUTPATIENT
Start: 2023-11-29 | End: 2023-11-30 | Stop reason: HOSPADM

## 2023-11-29 RX ORDER — CEFTRIAXONE 1 G/50ML
1 INJECTION, SOLUTION INTRAVENOUS EVERY 24 HOURS
Status: DISCONTINUED | OUTPATIENT
Start: 2023-11-29 | End: 2023-11-29

## 2023-11-29 RX ORDER — LEVOTHYROXINE SODIUM 112 UG/1
112 TABLET ORAL DAILY
Status: DISCONTINUED | OUTPATIENT
Start: 2023-11-30 | End: 2023-11-30 | Stop reason: HOSPADM

## 2023-11-29 RX ORDER — WARFARIN SODIUM 5 MG/1
5 TABLET ORAL ONCE
Status: DISCONTINUED | OUTPATIENT
Start: 2023-11-29 | End: 2023-11-30 | Stop reason: HOSPADM

## 2023-11-29 RX ORDER — HEPARIN SODIUM 1000 [USP'U]/ML
2000 INJECTION, SOLUTION INTRAVENOUS; SUBCUTANEOUS
Status: DISCONTINUED | OUTPATIENT
Start: 2023-11-30 | End: 2023-11-29

## 2023-11-29 RX ORDER — METOPROLOL TARTRATE 50 MG/1
50 TABLET ORAL 2 TIMES DAILY
Status: DISCONTINUED | OUTPATIENT
Start: 2023-11-29 | End: 2023-11-30 | Stop reason: HOSPADM

## 2023-11-29 RX ADMIN — METOPROLOL TARTRATE 50 MG: 50 TABLET, FILM COATED ORAL at 20:14

## 2023-11-29 RX ADMIN — HEPARIN SODIUM 1800 UNITS: 1000 INJECTION INTRAVENOUS; SUBCUTANEOUS at 18:58

## 2023-11-29 RX ADMIN — HEPARIN SODIUM 1600 UNITS: 1000 INJECTION INTRAVENOUS; SUBCUTANEOUS at 18:57

## 2023-11-29 RX ADMIN — LEVOFLOXACIN 250 MG: 250 INJECTION, SOLUTION INTRAVENOUS at 21:00

## 2023-11-29 SDOH — SOCIAL STABILITY: SOCIAL INSECURITY: HAVE YOU HAD THOUGHTS OF HARMING ANYONE ELSE?: NO

## 2023-11-29 SDOH — SOCIAL STABILITY: SOCIAL INSECURITY: DOES ANYONE TRY TO KEEP YOU FROM HAVING/CONTACTING OTHER FRIENDS OR DOING THINGS OUTSIDE YOUR HOME?: NO

## 2023-11-29 SDOH — SOCIAL STABILITY: SOCIAL INSECURITY: DO YOU FEEL UNSAFE GOING BACK TO THE PLACE WHERE YOU ARE LIVING?: NO

## 2023-11-29 SDOH — SOCIAL STABILITY: SOCIAL INSECURITY: WERE YOU ABLE TO COMPLETE ALL THE BEHAVIORAL HEALTH SCREENINGS?: YES

## 2023-11-29 SDOH — SOCIAL STABILITY: SOCIAL INSECURITY: ARE THERE ANY APPARENT SIGNS OF INJURIES/BEHAVIORS THAT COULD BE RELATED TO ABUSE/NEGLECT?: NO

## 2023-11-29 SDOH — SOCIAL STABILITY: SOCIAL INSECURITY: ARE YOU OR HAVE YOU BEEN THREATENED OR ABUSED PHYSICALLY, EMOTIONALLY, OR SEXUALLY BY ANYONE?: NO

## 2023-11-29 SDOH — SOCIAL STABILITY: SOCIAL INSECURITY: HAS ANYONE EVER THREATENED TO HURT YOUR FAMILY OR YOUR PETS?: NO

## 2023-11-29 SDOH — SOCIAL STABILITY: SOCIAL INSECURITY: DO YOU FEEL ANYONE HAS EXPLOITED OR TAKEN ADVANTAGE OF YOU FINANCIALLY OR OF YOUR PERSONAL PROPERTY?: NO

## 2023-11-29 SDOH — SOCIAL STABILITY: SOCIAL INSECURITY: ABUSE: ADULT

## 2023-11-29 ASSESSMENT — PATIENT HEALTH QUESTIONNAIRE - PHQ9
2. FEELING DOWN, DEPRESSED OR HOPELESS: NOT AT ALL
SUM OF ALL RESPONSES TO PHQ9 QUESTIONS 1 & 2: 0
1. LITTLE INTEREST OR PLEASURE IN DOING THINGS: NOT AT ALL

## 2023-11-29 ASSESSMENT — COGNITIVE AND FUNCTIONAL STATUS - GENERAL
TURNING FROM BACK TO SIDE WHILE IN FLAT BAD: A LITTLE
STANDING UP FROM CHAIR USING ARMS: A LITTLE
PATIENT BASELINE BEDBOUND: NO
MOVING FROM LYING ON BACK TO SITTING ON SIDE OF FLAT BED WITH BEDRAILS: A LITTLE
CLIMB 3 TO 5 STEPS WITH RAILING: A LOT
DAILY ACTIVITIY SCORE: 21
TOILETING: A LITTLE
WALKING IN HOSPITAL ROOM: A LITTLE
MOBILITY SCORE: 17
MOVING FROM LYING ON BACK TO SITTING ON SIDE OF FLAT BED WITH BEDRAILS: A LITTLE
TURNING FROM BACK TO SIDE WHILE IN FLAT BAD: A LITTLE
PERSONAL GROOMING: A LITTLE
TOILETING: A LITTLE
WALKING IN HOSPITAL ROOM: A LITTLE
PERSONAL GROOMING: A LITTLE
DAILY ACTIVITIY SCORE: 21
MOVING TO AND FROM BED TO CHAIR: A LITTLE
MOVING TO AND FROM BED TO CHAIR: A LITTLE
CLIMB 3 TO 5 STEPS WITH RAILING: A LOT
DRESSING REGULAR LOWER BODY CLOTHING: A LITTLE
DRESSING REGULAR LOWER BODY CLOTHING: A LITTLE
STANDING UP FROM CHAIR USING ARMS: A LITTLE
MOBILITY SCORE: 17

## 2023-11-29 ASSESSMENT — LIFESTYLE VARIABLES
EVER HAD A DRINK FIRST THING IN THE MORNING TO STEADY YOUR NERVES TO GET RID OF A HANGOVER: NO
HOW MANY STANDARD DRINKS CONTAINING ALCOHOL DO YOU HAVE ON A TYPICAL DAY: PATIENT DOES NOT DRINK
PRESCIPTION_ABUSE_PAST_12_MONTHS: NO
EVER FELT BAD OR GUILTY ABOUT YOUR DRINKING: NO
HAVE YOU EVER FELT YOU SHOULD CUT DOWN ON YOUR DRINKING: NO
SUBSTANCE_ABUSE_PAST_12_MONTHS: NO
SKIP TO QUESTIONS 9-10: 1
HOW OFTEN DO YOU HAVE 6 OR MORE DRINKS ON ONE OCCASION: NEVER
AUDIT-C TOTAL SCORE: 0
AUDIT-C TOTAL SCORE: 0
HAVE PEOPLE ANNOYED YOU BY CRITICIZING YOUR DRINKING: NO
HOW OFTEN DO YOU HAVE A DRINK CONTAINING ALCOHOL: NEVER
REASON UNABLE TO ASSESS: NO

## 2023-11-29 ASSESSMENT — ACTIVITIES OF DAILY LIVING (ADL)
FEEDING YOURSELF: INDEPENDENT
GROOMING: INDEPENDENT
DRESSING YOURSELF: INDEPENDENT
ADEQUATE_TO_COMPLETE_ADL: YES
PATIENT'S MEMORY ADEQUATE TO SAFELY COMPLETE DAILY ACTIVITIES?: YES
BATHING: INDEPENDENT
LACK_OF_TRANSPORTATION: NO
ASSISTIVE_DEVICE: WALKER
HEARING - RIGHT EAR: DIFFICULTY WITH NOISE
TOILETING: INDEPENDENT
WALKS IN HOME: NEEDS ASSISTANCE
HEARING - LEFT EAR: DIFFICULTY WITH NOISE
JUDGMENT_ADEQUATE_SAFELY_COMPLETE_DAILY_ACTIVITIES: YES

## 2023-11-29 ASSESSMENT — PAIN DESCRIPTION - PROGRESSION: CLINICAL_PROGRESSION: NOT CHANGED

## 2023-11-29 ASSESSMENT — COLUMBIA-SUICIDE SEVERITY RATING SCALE - C-SSRS
1. IN THE PAST MONTH, HAVE YOU WISHED YOU WERE DEAD OR WISHED YOU COULD GO TO SLEEP AND NOT WAKE UP?: NO
2. HAVE YOU ACTUALLY HAD ANY THOUGHTS OF KILLING YOURSELF?: NO
2. HAVE YOU ACTUALLY HAD ANY THOUGHTS OF KILLING YOURSELF?: NO
6. HAVE YOU EVER DONE ANYTHING, STARTED TO DO ANYTHING, OR PREPARED TO DO ANYTHING TO END YOUR LIFE?: NO
6. HAVE YOU EVER DONE ANYTHING, STARTED TO DO ANYTHING, OR PREPARED TO DO ANYTHING TO END YOUR LIFE?: NO
1. IN THE PAST MONTH, HAVE YOU WISHED YOU WERE DEAD OR WISHED YOU COULD GO TO SLEEP AND NOT WAKE UP?: NO

## 2023-11-29 ASSESSMENT — PAIN SCALES - GENERAL
PAINLEVEL_OUTOF10: 7
PAINLEVEL_OUTOF10: 0 - NO PAIN

## 2023-11-29 ASSESSMENT — PAIN - FUNCTIONAL ASSESSMENT
PAIN_FUNCTIONAL_ASSESSMENT: NO/DENIES PAIN
PAIN_FUNCTIONAL_ASSESSMENT: 0-10

## 2023-11-29 ASSESSMENT — PAIN DESCRIPTION - LOCATION: LOCATION: LEG

## 2023-11-29 NOTE — CARE PLAN
Unable to assess pt at this time; Care Coordinator checked ED room Twice; pt not in room; per ED nurse, pt is in Dialysis. Per the H & P pt missed Mondays, dialysis, c/o diarrhea, weakness and abd pain.

## 2023-11-29 NOTE — PROGRESS NOTES
Attestation note/supervisory note for DANIEL Andrade      The patient is an 82-year-old female presenting to the emergency department by EMS from home for evaluation of generalized malaise and fatigue. the patient states that she just feels like she has the flu.  She reports that she has also had several loose stools over the past several days.  Her daughter is reportedly ill with similar symptoms.  The patient states that she has not gone to dialysis for her past 2 treatments, on 27 Nov 23 and today,  because of her symptoms.  No recent travel.  No recent antibiotic use.  No headache or visual changes.  No chest pain or shortness of breath.  No abdominal pain.  No nausea vomiting.  No   Constipation.  No urinary complaints.  No vaginal discharge.  No fever or chills.  All pertinent positives and negatives are recorded above.  All other systems reviewed and otherwise negative.  Vital signs with mild hypertension but otherwisewithin normal limits.   the initial set of vital signs showed some borderline tachycardia but this was not supported by the monitor and/or EKG.  Physical exam with a well-nourished well-developed female in no acute distress.  HEENT exam with dry mucous membranes but otherwise unremarkable.  She has no evidence of airway compromise or respiratory distress.  Abdominal exam is benign.  She has no gross motor, neurologic or vascular deficits on exam.       EKG with widened QRS rhythm, rate of 60 bpm, right bundle branch block pattern, normal axis, normal ST segment, normal T waves       diagnostic labs with  of anemia, evidence of urinary tract infection, elevated BNP, mild electrolyte imbalance, evidence of chronic renal failure, and elevated Troponin T but otherwise unremarkable.       initial Troponin T 102. Repeat trop T 98. Delta trop T 4       COVID-19 testing negative      XR chest 1 view   Final Result   No acute cardiopulmonary disease.        Signed by: Renzo Benavides 11/29/2023 10:29 AM    Dictation workstation:   [id number]             The patient does not have any evidence of ischemia on EKG but she does have an elevated troponin T.  It is unclear if this is due to a primary cardiac issue and or related to her renal insufficiency and failure to comply with dialysis.  Suspect the latter.  The patient also has evidence of CHF by diagnostic labs but no evidence of acute CHF on chest x-ray.  She does not have any evidence of airway compromise or respiratory distress..   IV Levaquin was initiated for treatment of her urinary tract infection.        Nephrology, Dr. Pagan,  was consulted and will arrange for dialysis.        The hospitalist, Dr. Ozuna,  Admitted the patient for further management and trending of her cardiac enzymes.         impression/diagnosis   noncompliance with  dialysis    end-stage renal disease on hemodialysis   generalized malaise fatigue  4.   Elevated Troponin T  5.  acute lower urinary tract infection  6.  anemia, unspecified       critical care time of 33  and is billed for management of CHF exacerbation due to failure to comply with dialysis with arrangement for dialysis/consultation with nephrology, initiation of IV antibiotics for treatment of her urinary tract infection, monitoring of the patient on telemetry, consultation with the patient regarding her results, consultation with accepting provider and arrangement for admission.  .  This time excludes time for billable procedures.      critical care time billed for by me is non concurrent with time billed for by DANIEL Andrade       I personally saw the patient and performed a substantive portion of the visit including all aspects of the medical decision making.        I reviewed the results of the diagnostic labs and diagnostic imaging.  Formal radiology reading was completed by the radiologist      Yola Edwards MD

## 2023-11-29 NOTE — ED NOTES
Nurse sai has notified me, and MD via message that pt B/P dropped during dialysis. Pt doesn't have any abnormal s/s. UF is turned off. MD gunn, and attending MD has been notified. Care plan ongoing      Mariann Gray RN  11/29/23 9485       Mariann Gray RN  11/29/23 2131

## 2023-11-29 NOTE — ED PROVIDER NOTES
HPI   Chief Complaint   Patient presents with    Flu Symptoms       Patient is 82-year-old female with a history of end-stage renal disease on dialysis presenting to the emergency department for body aches and diarrhea.  Patient states symptoms started approximately 4 to 5 days ago.  Better or worse.  She states she missed dialysis on Monday and today due to not feeling well.  She has not followed up with her doctor.  She denies chest pain, shortness of breath, fever, chills, nausea, vomiting, abdominal pain, recent travel, recent illness.  She states that her daughter is also at home sick with lower symptoms.                          [place] Coma Scale Score: 15                  Patient History   No past medical history on file.  No past surgical history on file.  Family History   Problem Relation Name Age of Onset    Heart disease Mother      Diabetes Mother      Emphysema Father      COPD Father       Social History     Tobacco Use    Smoking status: Some Days     Types: Cigarettes    Smokeless tobacco: Never   Substance Use Topics    Alcohol use: Never    Drug use: Never       Physical Exam   ED Triage Vitals   Temp Pulse Resp BP   -- -- -- --      SpO2 Temp src Heart Rate Source Patient Position   -- -- -- --      BP Location FiO2 (%)     -- --       Physical Exam  Vitals and nursing note reviewed.   Constitutional:       General: She is not in acute distress.     Appearance: Normal appearance. She is normal weight. She is not ill-appearing or toxic-appearing.   HENT:      Head: Normocephalic and atraumatic.      Nose: Nose normal.      Mouth/Throat:      Mouth: Mucous membranes are moist.   Eyes:      Extraocular Movements: Extraocular movements intact.      Conjunctiva/sclera: Conjunctivae normal.      Pupils: Pupils are equal, round, and reactive to light.   Cardiovascular:      Rate and Rhythm: Normal rate and regular rhythm.      Pulses: Normal pulses.      Heart sounds: Normal heart sounds. No murmur  heard.     No friction rub. No gallop.   Pulmonary:      Effort: Pulmonary effort is normal.      Breath sounds: Normal breath sounds.   Abdominal:      General: Abdomen is flat.      Palpations: Abdomen is soft.      Tenderness: There is no abdominal tenderness. There is no guarding or rebound.   Musculoskeletal:         General: Normal range of motion.      Cervical back: Normal range of motion and neck supple.   Skin:     General: Skin is warm and dry.   Neurological:      General: No focal deficit present.      Mental Status: She is alert and oriented to person, place, and time.      Sensory: No sensory deficit.      Motor: No weakness.   Psychiatric:         Mood and Affect: Mood normal.         Behavior: Behavior normal.         ED Course & MDM   ED Course as of 11/29/23 1227   Wed Nov 29, 2023   1112 Comprehensive metabolic panel(!) [AJ]      ED Course User Index  [AJ] Pam Andrade PA-C         Diagnoses as of 11/29/23 1227   UTI (urinary tract infection)       Medical Decision Making  Parts of this chart have been completed using voice recognition software. Please excuse any errors of transcription. Despite the medical decision making time stamp above-my medical decision making has taken place during the patient's entire visit. My thought process and reason for plan has been formulated from the time that I saw the patient until the time of disposition and is not specific to one specific moment during their visit and furthermore my MDM encompasses this entire chart and not only this text box.    Patient seen in conjunction with attending physician .    HPI: Detailed above.    Exam: A medically appropriate exam performed, outlined above, given the known history and presentation.    History obtained from: Patient    EKG: Reviewed by my attending physician    Social Determinants of Health considered during this visit: Lives at home    Labs/Diagnostics:  Labs Reviewed   URINALYSIS WITH REFLEX  MICROSCOPIC - Abnormal       Result Value    Color, Urine Dark-Orange (*)     Appearance, Urine Ex.Turbid (*)     Specific Gravity, Urine 1.010      pH, Urine 7.5      Protein, Urine 100 (2+) (*)     Glucose, Urine Normal      Blood, Urine 0.06 (1+) (*)     Ketones, Urine NEGATIVE      Bilirubin, Urine NEGATIVE      Urobilinogen, Urine Normal      Nitrite, Urine NEGATIVE      Leukocyte Esterase, Urine 500 Yaneth/µL (*)    CBC WITH AUTO DIFFERENTIAL - Abnormal    WBC 5.0      nRBC 0.0      RBC 3.58 (*)     Hemoglobin 10.5 (*)     Hematocrit 33.1 (*)     MCV 93      MCH 29.3      MCHC 31.7 (*)     RDW 17.2 (*)     Platelets 58 (*)     Neutrophils % 51.3      Immature Granulocytes %, Automated 0.2      Lymphocytes % 31.5      Monocytes % 6.3      Eosinophils % 8.9      Basophils % 1.8      Neutrophils Absolute 2.58      Immature Granulocytes Absolute, Automated 0.01      Lymphocytes Absolute 1.59      Monocytes Absolute 0.32      Eosinophils Absolute 0.45 (*)     Basophils Absolute 0.09     COMPREHENSIVE METABOLIC PANEL - Abnormal    Glucose 81      Sodium 135      Potassium 4.9      Chloride 97      Bicarbonate 23 (*)     Urea Nitrogen 44 (*)     Creatinine 7.90 (*)     eGFR 5 (*)     Calcium 8.9      Albumin 3.6      Alkaline Phosphatase 72      Total Protein 7.2      AST 29      Bilirubin, Total 0.6      ALT 17      Anion Gap 15     N-TERMINAL PROBNP - Abnormal    PROBNP 11,828 (*)     Narrative:     Reference ranges are based on clinical submission data. These ranges represent the 95th percentile of normal cut-off points. As NT Pro- BNP values approach 1000 pg/ml, clinical symptoms are more likely associated with CHF.   SERIAL TROPONIN, INITIAL ([facility]) - Abnormal    Troponin T, High Sensitivity 102 (*)    SERIAL TROPONIN,  2 HOUR ([facility]) - Abnormal    Troponin T, High Sensitivity 98 (*)    MICROSCOPIC ONLY, URINE - Abnormal    WBC, Urine >50 (*)     WBC Clumps, Urine MANY      RBC, Urine >20 (*)     Squamous Epithelial  Cells, Urine 1-9 (SPARSE)     LIPASE - Normal    Lipase 17     SARS-COV-2 PCR, SYMPTOMATIC - Normal    Coronavirus 2019, PCR Not Detected      Narrative:     This assay has received FDA Emergency Use Authorization (EUA) and is only authorized for the duration of time that circumstances exist to justify the authorization of the emergency use of in vitro diagnostic tests for the detection of SARS-CoV-2 virus and/or diagnosis of COVID-19 infection under section 564(b)(1) of the Act, 21 U.S.C. 360bbb-3(b)(1). This assay is an in vitro diagnostic nucleic acid amplification test for the qualitative detection of SARS-CoV-2 from nasopharyngeal specimens and has been validated for use at [facility]. Negative results do not preclude COVID-19 infections and should not be used as the sole basis for diagnosis, treatment, or other management decisions.     INFLUENZA A AND B PCR - Normal    Flu A Result Not Detected      Flu B Result Not Detected      Narrative:     This assay is an in vitro diagnostic multiplex nucleic acid amplification test for the detection and discrimination of Influenza A & B from nasopharyngeal specimens, and has been validated for use at [facility]. Negative results do not preclude Influenza A/B infections, and should not be used as the sole basis for diagnosis, treatment, or other management decisions. If Influenza A/B and RSV PCR results are negative, testing for Parainfluenza virus, Adenovirus and Metapneumovirus is routinely performed for [facility] pediatric oncology and intensive care inpatients, and is available on other patients by placing an add-on request.   C. DIFFICILE, PCR   TROPONIN T SERIES, HIGH SENSITIVITY (0, 2 HR, 6 HR)    Narrative:     The following orders were created for panel order Troponin T Series, High Sensitivity (0, 2HR, 6HR).  Procedure                               Abnormality         Status                     ---------                                -----------         ------                     Serial Troponin, Initial...[[id number]]  Abnormal            Final result               Serial Troponin, 2 Hour ...[[id number]]  Abnormal            Final result               Serial Troponin, 6 Hour ...[[id number]]                                                   Please view results for these tests on the individual orders.   SERIAL TROPONIN, 6 HOUR ([facility])   MORPHOLOGY    RBC Morphology No significant RBC morphology present       XR chest 1 view   Final Result   No acute cardiopulmonary disease.        Signed by: Renzo Benavides 11/29/2023 10:29 AM   Dictation workstation:   [id number]        Considerations/further MDM:  Patient is a 82-year-old female with a history of end-stage renal disease presenting evaluation of bodyaches and diarrhea.  On physical exam vital signs remarkable for hypertension but otherwise stable and patient is in no acute distress.  Physical exam with the well-developed well-appearing female in no acute distress.  No signs of respiratory distress or airway compromise.  No rebound or guarding on abdominal exam.  Diagnostic labs and imaging ordered.  Labs remarkable for mild anemia with a hemoglobin of 10.5 and hematocrit 33.1.  No leukocytosis.  Troponin increased but stable at 102 and 98.  CMP remarkable for creatinine of 7.9 and EGFR of 5 consistent with patient's end-stage renal disease.  Lipase normal.  proBNP 11,828.  Urine shows evidence of infection.  Chest x-ray showed no acute cardiopulmonary process.  Patient was discussed with Dr. Pagan who agreed to consult. Patient will be admitted for UTI and diarrhea. Patient was discussed with Dr. Ozuna who agreed with admission.     Procedure  Procedures     Pam Andrade PA-C  11/29/23 5126

## 2023-11-29 NOTE — ED TRIAGE NOTES
Exhibiting flu like sx nausea and diarrhea since Monday. Aches/chills and hot flashes. PT has missed dialysis Monday due to not feeling well, and did not go today. No cough or fevers noted.

## 2023-11-29 NOTE — ED NOTES
Patient presents to the ER via EMS for complaints of     Exhibiting flu like sx nausea and diarrhea since Monday. Aches/chills and hot flashes. PT has missed dialysis Monday due to not feeling well, and did not go today. No cough or fevers noted.             PMHX:  Past Medical History:   Diagnosis Date    Disease of thyroid gland     Hemodialysis access site with arteriovenous graft (CMS/HCC)     Hypertension     Renal disorder         Allergies   Allergen Reactions    Amoxicillin Hives    Azithromycin Diarrhea    Lisinopril Cough    Amlodipine Besylate Rash    Ampicillin Rash    Carvedilol Rash    Pravastatin Sodium Anxiety         LABS:     Latest Reference Range & Units 11/29/23 08:53   GLUCOSE 65 - 99 mg/dL 81   SODIUM 133 - 145 mmol/L 135   POTASSIUM 3.4 - 5.1 mmol/L 4.9   CHLORIDE 97 - 107 mmol/L 97   Bicarbonate 24 - 31 mmol/L 23 (L)   Anion Gap <=19 mmol/L 15   Blood Urea Nitrogen 8 - 25 mg/dL 44 (H)   Creatinine 0.40 - 1.60 mg/dL 7.90 (H)   EGFR >60 mL/min/1.73m*2 5 (L)   Calcium 8.5 - 10.4 mg/dL 8.9   Albumin 3.5 - 5.0 g/dL 3.6   Alkaline Phosphatase 35 - 125 U/L 72   ALT 5 - 40 U/L 17   AST 5 - 40 U/L 29   Bilirubin Total 0.1 - 1.2 mg/dL 0.6   Total Protein 5.9 - 7.9 g/dL 7.2   LIPASE 16 - 63 U/L 17   (L): Data is abnormally low  (H): Data is abnormally high   Latest Reference Range & Units 11/29/23 08:56   Flu A Result Not Detected  Not Detected   Flu B Result Not Detected  Not Detected   Coronavirus 2019, PCR Not Detected  Not Detected   Color, Urine Light-Yellow, Yellow, Dark-Yellow  Dark-Orange !   Appearance, Urine Clear  Ex.Turbid !   Specific Gravity, Urine 1.005 - 1.035  1.010   pH, Urine 5.0, 5.5, 6.0, 6.5, 7.0, 7.5, 8.0  7.5   Protein, Urine NEGATIVE, 10 (TRACE), 20 (TRACE) mg/dL 100 (2+) !   Glucose, Urine Normal mg/dL Normal   Blood, Urine NEGATIVE  0.06 (1+) !   Ketones, Urine NEGATIVE mg/dL NEGATIVE   Bilirubin, Urine NEGATIVE  NEGATIVE   Urobilinogen, Urine Normal mg/dL Normal    Nitrite, Urine NEGATIVE  NEGATIVE   Leukocyte Esterase, Urine NEGATIVE  500 Yaneth/µL !   !: Data is abnormal   Latest Reference Range & Units 11/29/23 08:53   PROBNP 0 - 624 pg/mL 11,828 (H)   (H): Data is abnormally high    PLAN:    CXR, SWAB, LABS, UA                   Rosemarie Zhao, RN  11/29/23 0808

## 2023-11-29 NOTE — PROCEDURES
Report received from GODFREY Gray  Hemodialysis Treatment to be done in dialysis room  Name and  verified verbally by patient.   Bed weight non functioning. Per ED weight is 77.7KG  Patient is on room air.  Prescribed Hemodialysis orders are verified in the machine.  Hemodialysis orders are for 2.5 Liters of fluid removal.     Patient has a RIGHT SUBCLAVIAN CVC  Dressing is CLEAN DRY AND INTACT.   No visible S/S of infection noted.  Patient denies pain near insertion site.  Dressing changed per policy, timed, dated, initialed.     CVC accessed with aseptic technique.   Caps removed.  Arterial and venous lumens aspirated without difficulty.  Patency of both lumens checked with 2 (10) ml normal saline flushes.        Treatment initiated @ 1449, approximate completion time is 184.

## 2023-11-29 NOTE — H&P
History Of Present Illness  Noemi Man is a 82 y.o. female with history of ESRD on HD, paroxsymal afib, htn here with diarrhea, weakness, and missed dialysis. Patient reports her daughter developed some kind of GI illness last week with diarrhea, abdominal cramps. Says her daughter wasn't eating or drinking and patient tried to get her daughter to go to doctor. On Monday, patient developed the same symptoms of upset stomach and diarrhea. No blood in stool. No N/V. +weakness and chills. No fevers. She says she is eating but not as much as normal. She felt too ill to go to dialysis on Mon/Wed. She denies any SOB, cough, chest pain.     Past Medical History  She has a past medical history of A-fib (CMS/[facility]), Disease of thyroid gland, ESRD (end stage renal disease) (CMS/[facility]), Hemodialysis access site with arteriovenous graft (CMS/[facility]), Hypertension, and Renal disorder.    Surgical History  She has a past surgical history that includes Abdominal aortic aneurysm repair; Cholecystectomy; AV fistula placement; and pacemaker placement.     Social History  She reports that she has been smoking cigarettes. She has never used smokeless tobacco. She reports that she does not drink alcohol and does not use drugs.    Family History  Family History   Problem Relation Name Age of Onset    Heart disease Mother      Diabetes Mother      Emphysema Father      COPD Father          Allergies  Amoxicillin, Azithromycin, Lisinopril, Amlodipine besylate, Ampicillin, Carvedilol, and Pravastatin sodium    Review of Systems  General: + fatigue, + malaise, + chills   HENT: no rhinorrhea, no sore throat, no ear pain   Eyes: no change in vision, denies eye pain or discharge   Lungs: no SOB, no cough, no hemoptysis   CV: no chest pain, no palpitations, + leg edema   Abd: no nausea/vomiting, + diarrhea, no abdominal pain   : no dysuria, no frequency, no nocturia, no flank pain   Endocrine: no polydipsia/polyuria, no hot or cold  intolerance   Neuro: no headaches, no syncope, no seizures   MSK: no back pain, no neck pain, no joint problems   Psych: no anxiety, no depression, no hallucinations         Physical Exam  General: alert, no diaphoresis   HENT: mucous membranes moist, external ears normal, no rhinorrhea   Eyes: no icterus or injection, no discharge   Lungs: CTA BL   Heart: RRR, +1 LE edema BL   GI: abdomen soft, nontender, nondistended, BS present   MSK: no joint effusion or deformity   Skin: no rashes, erythema, or ecchymosis   Neuro: grossly normal cognition, motor strength, sensation       Last Recorded Vitals  /79   Pulse 60   Temp 36.9 °C (98.4 °F)   Resp 19   Wt 77.7 kg (171 lb 4.8 oz)   SpO2 99%     Relevant Results        Results for orders placed or performed during the hospital encounter of 11/29/23 (from the past 24 hour(s))   Comprehensive metabolic panel   Result Value Ref Range    Glucose 81 65 - 99 mg/dL    Sodium 135 133 - 145 mmol/L    Potassium 4.9 3.4 - 5.1 mmol/L    Chloride 97 97 - 107 mmol/L    Bicarbonate 23 (L) 24 - 31 mmol/L    Urea Nitrogen 44 (H) 8 - 25 mg/dL    Creatinine 7.90 (H) 0.40 - 1.60 mg/dL    eGFR 5 (L) >60 mL/min/1.73m*2    Calcium 8.9 8.5 - 10.4 mg/dL    Albumin 3.6 3.5 - 5.0 g/dL    Alkaline Phosphatase 72 35 - 125 U/L    Total Protein 7.2 5.9 - 7.9 g/dL    AST 29 5 - 40 U/L    Bilirubin, Total 0.6 0.1 - 1.2 mg/dL    ALT 17 5 - 40 U/L    Anion Gap 15 <=19 mmol/L   Lipase   Result Value Ref Range    Lipase 17 16 - 63 U/L   NT Pro-BNP   Result Value Ref Range    PROBNP 11,828 (H) 0 - 624 pg/mL   Serial Troponin, Initial ([facility])   Result Value Ref Range    Troponin T, High Sensitivity 102 (H) <=15 ng/L   Urinalysis with Reflex Microscopic   Result Value Ref Range    Color, Urine Dark-Orange (N) Light-Yellow, Yellow, Dark-Yellow    Appearance, Urine Ex.Turbid (N) Clear    Specific Gravity, Urine 1.010 1.005 - 1.035    pH, Urine 7.5 5.0, 5.5, 6.0, 6.5, 7.0, 7.5, 8.0    Protein, Urine 100  (2+) (A) NEGATIVE, 10 (TRACE), 20 (TRACE) mg/dL    Glucose, Urine Normal Normal mg/dL    Blood, Urine 0.06 (1+) (A) NEGATIVE    Ketones, Urine NEGATIVE NEGATIVE mg/dL    Bilirubin, Urine NEGATIVE NEGATIVE    Urobilinogen, Urine Normal Normal mg/dL    Nitrite, Urine NEGATIVE NEGATIVE    Leukocyte Esterase, Urine 500 Yaneth/µL (A) NEGATIVE   SARS-CoV-2 RT PCR   Result Value Ref Range    Coronavirus 2019, PCR Not Detected Not Detected   Influenza A, and B PCR   Result Value Ref Range    Flu A Result Not Detected Not Detected    Flu B Result Not Detected Not Detected   Microscopic Only, Urine   Result Value Ref Range    WBC, Urine >50 (A) 1-5, NONE /HPF    WBC Clumps, Urine MANY Reference range not established. /HPF    RBC, Urine >20 (A) NONE, 1-2, 3-5 /HPF    Squamous Epithelial Cells, Urine 1-9 (SPARSE) Reference range not established. /HPF   CBC and Auto Differential   Result Value Ref Range    WBC 5.0 4.4 - 11.3 x10*3/uL    nRBC 0.0 0.0 - 0.0 /100 WBCs    RBC 3.58 (L) 4.00 - 5.20 x10*6/uL    Hemoglobin 10.5 (L) 12.0 - 16.0 g/dL    Hematocrit 33.1 (L) 36.0 - 46.0 %    MCV 93 80 - 100 fL    MCH 29.3 26.0 - 34.0 pg    MCHC 31.7 (L) 32.0 - 36.0 g/dL    RDW 17.2 (H) 11.5 - 14.5 %    Platelets 58 (L) 150 - 450 x10*3/uL    Neutrophils % 51.3 40.0 - 80.0 %    Immature Granulocytes %, Automated 0.2 0.0 - 0.9 %    Lymphocytes % 31.5 13.0 - 44.0 %    Monocytes % 6.3 2.0 - 10.0 %    Eosinophils % 8.9 0.0 - 6.0 %    Basophils % 1.8 0.0 - 2.0 %    Neutrophils Absolute 2.58 1.60 - 5.50 x10*3/uL    Immature Granulocytes Absolute, Automated 0.01 0.00 - 0.50 x10*3/uL    Lymphocytes Absolute 1.59 0.80 - 3.00 x10*3/uL    Monocytes Absolute 0.32 0.05 - 0.80 x10*3/uL    Eosinophils Absolute 0.45 (H) 0.00 - 0.40 x10*3/uL    Basophils Absolute 0.09 0.00 - 0.10 x10*3/uL   Serial Troponin, 2 Hour ([facility])   Result Value Ref Range    Troponin T, High Sensitivity 98 (H) <=15 ng/L   Morphology   Result Value Ref Range    RBC Morphology No  significant RBC morphology present        Scheduled medications  [START ON 11/30/2023] heparin, 2,000 Units, intra-catheter, After Dialysis  [START ON 11/30/2023] heparin, 2,000 Units, intra-catheter, After Dialysis  levoFLOXacin, 250 mg, intravenous, Once  [START ON 12/1/2023] levoFLOXacin, 250 mg, intravenous, q48h      Continuous medications     PRN medications  PRN medications: acetaminophen, melatonin         Assessment/Plan   Principal Problem:    UTI (urinary tract infection)    UTI  - treat empirically with levaquin. Will follow culture    ESRD on HD  - missed two sessions of HD  - follows with Dr. Pagan who has placed dialysis orders for today  - she is normally MWF    Diarrhea  Viral gastroenteritis  - suspect viral GI illness. Will check  cdiff to r/o    Paroxsymal afib  - called patient's pharmacy to verify meds, she has not filled anticoagulation since February according to them  - will try to clarify with patient- patient should be on AC for stroke prophylaxis. She was previously on eliquis but when she started HD, was transitioned to warfarin.   - she does take amiodarone 200 daily and lopressor 50 mg BID, will reorder    Levothyroxine  - synthroid 112 mcg qAM      Of note-- spoke to pharmacy. The only prescriptions patient has been picking up are the following:  Amiodarone 200 mg daily  Levothyroxine 112 mcg daily  Metoprolol tartrate 50 mg BID  Pharmacist says patient has not picked up warfarin since February.    I will update the home med rec after I can verify with patient. What has been documented as the home med rec appears very incorrect at this time.       Tia Ozuna DO      Addendum:  Spoke with patient. She tells me she takes her warfarin but she only takes 1/2 tablet and she only takes it sometimes. She says it caused her to turn black and blue. I mentioned to her that pharmacist says she hasn't filled the prescription since February, and she says then that she stopped taking it  all together 3-4 wks ago. She says over the summer she was having a hard time coming to get her INR checked at the hospital. Dr. Montgomery then ordered her a home INR monitor, but patient says that she and the visiting nurse both could not get it to work. The rep from the device company said he would send a new one, but they never got a new one.    I explained to her that it was her right to stop taking warfarin if she didn't like the side effects, but that she needed to understand the risks of not taking the medication. I explained that she has increased stroke risk due to her atrial fibrillation, and that is why she needs to be on it. She tells me that no one ever told her that, and she says she'd like to start taking the warfarin again. While she is here, I will discuss with case management how to get her a safe discharge plan with INR monitoring.

## 2023-11-30 VITALS
TEMPERATURE: 96.3 F | OXYGEN SATURATION: 94 % | BODY MASS INDEX: 31.81 KG/M2 | DIASTOLIC BLOOD PRESSURE: 41 MMHG | SYSTOLIC BLOOD PRESSURE: 107 MMHG | WEIGHT: 172.84 LBS | RESPIRATION RATE: 14 BRPM | HEIGHT: 62 IN | HEART RATE: 61 BPM

## 2023-11-30 LAB
ALBUMIN SERPL-MCNC: 3.3 G/DL (ref 3.5–5)
ANION GAP SERPL CALC-SCNC: 11 MMOL/L
BUN SERPL-MCNC: 16 MG/DL (ref 8–25)
CALCIUM SERPL-MCNC: 8.5 MG/DL (ref 8.5–10.4)
CHLORIDE SERPL-SCNC: 96 MMOL/L (ref 97–107)
CO2 SERPL-SCNC: 28 MMOL/L (ref 24–31)
CREAT SERPL-MCNC: 3.9 MG/DL (ref 0.4–1.6)
ERYTHROCYTE [DISTWIDTH] IN BLOOD BY AUTOMATED COUNT: 16.5 % (ref 11.5–14.5)
GFR SERPL CREATININE-BSD FRML MDRD: 11 ML/MIN/1.73M*2
GLUCOSE SERPL-MCNC: 78 MG/DL (ref 65–99)
HCT VFR BLD AUTO: 30.8 % (ref 36–46)
HGB BLD-MCNC: 10.4 G/DL (ref 12–16)
INR PPP: 1.2 (ref 0.9–1.2)
MAGNESIUM SERPL-MCNC: 2 MG/DL (ref 1.6–3.1)
MCH RBC QN AUTO: 29.7 PG (ref 26–34)
MCHC RBC AUTO-ENTMCNC: 33.8 G/DL (ref 32–36)
MCV RBC AUTO: 88 FL (ref 80–100)
NRBC BLD-RTO: 0 /100 WBCS (ref 0–0)
PHOSPHATE SERPL-MCNC: 3.2 MG/DL (ref 2.5–4.5)
PLATELET # BLD AUTO: 60 X10*3/UL (ref 150–450)
POTASSIUM SERPL-SCNC: 3.7 MMOL/L (ref 3.4–5.1)
PROTHROMBIN TIME: 12.6 SECONDS (ref 9.3–12.7)
RBC # BLD AUTO: 3.5 X10*6/UL (ref 4–5.2)
SODIUM SERPL-SCNC: 135 MMOL/L (ref 133–145)
WBC # BLD AUTO: 4.8 X10*3/UL (ref 4.4–11.3)

## 2023-11-30 PROCEDURE — 85027 COMPLETE CBC AUTOMATED: CPT | Performed by: HOSPITALIST

## 2023-11-30 PROCEDURE — G0008 ADMIN INFLUENZA VIRUS VAC: HCPCS | Performed by: HOSPITALIST

## 2023-11-30 PROCEDURE — G0378 HOSPITAL OBSERVATION PER HR: HCPCS

## 2023-11-30 PROCEDURE — 85610 PROTHROMBIN TIME: CPT | Performed by: HOSPITALIST

## 2023-11-30 PROCEDURE — 2500000004 HC RX 250 GENERAL PHARMACY W/ HCPCS (ALT 636 FOR OP/ED): Performed by: HOSPITALIST

## 2023-11-30 PROCEDURE — 36415 COLL VENOUS BLD VENIPUNCTURE: CPT | Performed by: HOSPITALIST

## 2023-11-30 PROCEDURE — 97161 PT EVAL LOW COMPLEX 20 MIN: CPT | Mod: GP

## 2023-11-30 PROCEDURE — 2500000002 HC RX 250 W HCPCS SELF ADMINISTERED DRUGS (ALT 637 FOR MEDICARE OP, ALT 636 FOR OP/ED): Performed by: HOSPITALIST

## 2023-11-30 PROCEDURE — 97116 GAIT TRAINING THERAPY: CPT | Mod: GP

## 2023-11-30 PROCEDURE — 90662 IIV NO PRSV INCREASED AG IM: CPT | Performed by: HOSPITALIST

## 2023-11-30 PROCEDURE — 97165 OT EVAL LOW COMPLEX 30 MIN: CPT | Mod: GO

## 2023-11-30 PROCEDURE — 80069 RENAL FUNCTION PANEL: CPT | Performed by: HOSPITALIST

## 2023-11-30 PROCEDURE — 2500000001 HC RX 250 WO HCPCS SELF ADMINISTERED DRUGS (ALT 637 FOR MEDICARE OP): Performed by: HOSPITALIST

## 2023-11-30 PROCEDURE — 83735 ASSAY OF MAGNESIUM: CPT | Performed by: HOSPITALIST

## 2023-11-30 RX ORDER — WARFARIN SODIUM 5 MG/1
5 TABLET ORAL ONCE
Status: COMPLETED | OUTPATIENT
Start: 2023-11-30 | End: 2023-11-30

## 2023-11-30 RX ORDER — LEVOFLOXACIN 250 MG/1
250 TABLET ORAL EVERY OTHER DAY
Qty: 3 TABLET | Refills: 0 | Status: SHIPPED | OUTPATIENT
Start: 2023-11-30 | End: 2023-12-05

## 2023-11-30 RX ORDER — WARFARIN 2.5 MG/1
2.5 TABLET ORAL NIGHTLY
Qty: 30 TABLET | Refills: 0 | Status: SHIPPED | OUTPATIENT
Start: 2023-11-30 | End: 2024-01-16

## 2023-11-30 RX ADMIN — AMIODARONE HYDROCHLORIDE 200 MG: 200 TABLET ORAL at 09:18

## 2023-11-30 RX ADMIN — LEVOTHYROXINE SODIUM 112 MCG: 112 TABLET ORAL at 06:00

## 2023-11-30 RX ADMIN — INFLUENZA A VIRUS A/VICTORIA/4897/2022 IVR-238 (H1N1) ANTIGEN (FORMALDEHYDE INACTIVATED), INFLUENZA A VIRUS A/DARWIN/9/2021 SAN-010 (H3N2) ANTIGEN (FORMALDEHYDE INACTIVATED), INFLUENZA B VIRUS B/PHUKET/3073/2013 ANTIGEN (FORMALDEHYDE INACTIVATED), AND INFLUENZA B VIRUS B/MICHIGAN/01/2021 ANTIGEN (FORMALDEHYDE INACTIVATED) 0.7 ML: 60; 60; 60; 60 INJECTION, SUSPENSION INTRAMUSCULAR at 17:41

## 2023-11-30 RX ADMIN — WARFARIN SODIUM 5 MG: 5 TABLET ORAL at 17:41

## 2023-11-30 SDOH — ECONOMIC STABILITY: FOOD INSECURITY: WITHIN THE PAST 12 MONTHS, YOU WORRIED THAT YOUR FOOD WOULD RUN OUT BEFORE YOU GOT THE MONEY TO BUY MORE.: NEVER TRUE

## 2023-11-30 SDOH — SOCIAL STABILITY: SOCIAL INSECURITY
WITHIN THE LAST YEAR, HAVE YOU BEEN RAPED OR FORCED TO HAVE ANY KIND OF SEXUAL ACTIVITY BY YOUR PARTNER OR EX-PARTNER?: NO

## 2023-11-30 SDOH — SOCIAL STABILITY: SOCIAL INSECURITY: WITHIN THE LAST YEAR, HAVE YOU BEEN AFRAID OF YOUR PARTNER OR EX-PARTNER?: NO

## 2023-11-30 SDOH — SOCIAL STABILITY: SOCIAL INSECURITY: WITHIN THE LAST YEAR, HAVE YOU BEEN HUMILIATED OR EMOTIONALLY ABUSED IN OTHER WAYS BY YOUR PARTNER OR EX-PARTNER?: NO

## 2023-11-30 SDOH — ECONOMIC STABILITY: INCOME INSECURITY: IN THE PAST 12 MONTHS, HAS THE ELECTRIC, GAS, OIL, OR WATER COMPANY THREATENED TO SHUT OFF SERVICE IN YOUR HOME?: NO

## 2023-11-30 SDOH — SOCIAL STABILITY: SOCIAL INSECURITY
WITHIN THE LAST YEAR, HAVE TO BEEN RAPED OR FORCED TO HAVE ANY KIND OF SEXUAL ACTIVITY BY YOUR PARTNER OR EX-PARTNER?: NO

## 2023-11-30 SDOH — HEALTH STABILITY: MENTAL HEALTH
DO YOU FEEL STRESS - TENSE, RESTLESS, NERVOUS, OR ANXIOUS, OR UNABLE TO SLEEP AT NIGHT BECAUSE YOUR MIND IS TROUBLED ALL THE TIME - THESE DAYS?: NOT AT ALL

## 2023-11-30 SDOH — SOCIAL STABILITY: SOCIAL INSECURITY
WITHIN THE LAST YEAR, HAVE YOU BEEN KICKED, HIT, SLAPPED, OR OTHERWISE PHYSICALLY HURT BY YOUR PARTNER OR EX-PARTNER?: NO

## 2023-11-30 SDOH — ECONOMIC STABILITY: FOOD INSECURITY: WITHIN THE PAST 12 MONTHS, YOU WORRIED THAT YOUR FOOD WOULD RUN OUT BEFORE YOU GOT MONEY TO BUY MORE.: NEVER TRUE

## 2023-11-30 SDOH — SOCIAL STABILITY: SOCIAL NETWORK: HOW OFTEN DO YOU GET TOGETHER WITH FRIENDS OR RELATIVES?: MORE THAN THREE TIMES A WEEK

## 2023-11-30 SDOH — HEALTH STABILITY: MENTAL HEALTH
STRESS IS WHEN SOMEONE FEELS TENSE, NERVOUS, ANXIOUS, OR CAN'T SLEEP AT NIGHT BECAUSE THEIR MIND IS TROUBLED. HOW STRESSED ARE YOU?: NOT AT ALL

## 2023-11-30 SDOH — ECONOMIC STABILITY: FOOD INSECURITY: WITHIN THE PAST 12 MONTHS, THE FOOD YOU BOUGHT JUST DIDN'T LAST AND YOU DIDN'T HAVE MONEY TO GET MORE.: NEVER TRUE

## 2023-11-30 SDOH — ECONOMIC STABILITY: INCOME INSECURITY: IN THE PAST 12 MONTHS HAS THE ELECTRIC, GAS, OIL, OR WATER COMPANY THREATENED TO SHUT OFF SERVICES IN YOUR HOME?: NO

## 2023-11-30 SDOH — SOCIAL STABILITY: SOCIAL NETWORK
IN A TYPICAL WEEK, HOW MANY TIMES DO YOU TALK ON THE PHONE WITH FAMILY, FRIENDS, OR NEIGHBORS?: MORE THAN THREE TIMES A WEEK

## 2023-11-30 ASSESSMENT — COGNITIVE AND FUNCTIONAL STATUS - GENERAL
WALKING IN HOSPITAL ROOM: A LITTLE
STANDING UP FROM CHAIR USING ARMS: A LITTLE
MOBILITY SCORE: 17
MOVING FROM LYING ON BACK TO SITTING ON SIDE OF FLAT BED WITH BEDRAILS: A LITTLE
MOBILITY SCORE: 17
DRESSING REGULAR UPPER BODY CLOTHING: A LITTLE
PERSONAL GROOMING: A LITTLE
TURNING FROM BACK TO SIDE WHILE IN FLAT BAD: A LITTLE
DRESSING REGULAR LOWER BODY CLOTHING: A LITTLE
DRESSING REGULAR LOWER BODY CLOTHING: A LITTLE
TURNING FROM BACK TO SIDE WHILE IN FLAT BAD: A LITTLE
STANDING UP FROM CHAIR USING ARMS: A LITTLE
TOILETING: A LITTLE
DAILY ACTIVITIY SCORE: 21
PERSONAL GROOMING: A LITTLE
MOVING FROM LYING ON BACK TO SITTING ON SIDE OF FLAT BED WITH BEDRAILS: A LITTLE
WALKING IN HOSPITAL ROOM: A LITTLE
CLIMB 3 TO 5 STEPS WITH RAILING: A LOT
DAILY ACTIVITIY SCORE: 19
HELP NEEDED FOR BATHING: A LITTLE
MOVING TO AND FROM BED TO CHAIR: A LITTLE
MOVING TO AND FROM BED TO CHAIR: A LITTLE
CLIMB 3 TO 5 STEPS WITH RAILING: A LOT
TOILETING: A LITTLE

## 2023-11-30 ASSESSMENT — PAIN - FUNCTIONAL ASSESSMENT
PAIN_FUNCTIONAL_ASSESSMENT: 0-10

## 2023-11-30 ASSESSMENT — ACTIVITIES OF DAILY LIVING (ADL)
BATHING_ASSISTANCE: MINIMAL
ADL_ASSISTANCE: INDEPENDENT
ADL_ASSISTANCE: INDEPENDENT
ADLS_ADDRESSED: NO

## 2023-11-30 ASSESSMENT — PAIN SCALES - GENERAL
PAINLEVEL_OUTOF10: 0 - NO PAIN

## 2023-11-30 NOTE — CONSULTS
CONSULT: Nephrology SERVICE    SERVICE DATE: 11/30/2023   SERVICE TIME:  3:53 PM    REASON FOR CONSULT: Dialysis management  REQUESTING PHYSICIAN: Dr. Ozuna  PRIMARY CARE PHYSICIAN: Santiago Maldonado MD    ASSESSMENT AND PLAN  82-year-old woman on dialysis admitted after having missed dialysis.  1.  End-stage renal disease  2.  Intradialytic hypotension  3.  Anemia of chronic kidney disease    She had dialysis yesterday according to her usual schedule and will receive dialysis tomorrow if she remains here.  I have no specific objection to discharge.  I discussed this case with Dr. Ozuna.  The platelets are stable.  Hemoglobin level is at goal above 10.  The hypotension during dialysis seems to have resolved.  She was asymptomatic.  Thank you for the consultation.     SUBJECTIVE  Ms. Man is a 82 y.o. woman with a medical history of hypertension, atrial fibrillation, thrombocytopenia, and end-stage renal disease admitted to the hospital yesterday due to having missed dialysis, consulted for dialysis management.  This woman regularly dialyzes on a Monday, Wednesday, Friday schedule.  She missed her treatment on Monday as she said she was feeling poorly.  She believes she contracted influenza from her daughter.  She currently has no symptoms.  She denies dysuria, denies cough, no shortness of breath.  Lately she has been using a tunneled catheter on her dialysis due to prolonged bleeding from her fistula site in the context of warfarin usage and thrombocytopenia.  I have been trying to get her into see a hematologist for numerous months now.  She frequently inquires about coming off of anticoagulation.  Generally she feels okay and wants to be discharged today.    PAST MEDICAL HISTORY:   Past Medical History:   Diagnosis Date    A-fib (CMS/[facility])     Disease of thyroid gland     ESRD (end stage renal disease) (CMS/[facility])     Hemodialysis access site with arteriovenous graft (CMS/[facility])     Hypertension      "Renal disorder      PAST SURGICAL HISTORY:   Past Surgical History:   Procedure Laterality Date    ABDOMINAL AORTIC ANEURYSM REPAIR      AV FISTULA PLACEMENT      CHOLECYSTECTOMY      PACEMAKER PLACEMENT       FAMILY HISTORY:   Family History   Problem Relation Name Age of Onset    Heart disease Mother      Diabetes Mother      Emphysema Father      COPD Father       SOCIAL HISTORY:   Social History     Tobacco Use    Smoking status: Some Days     Types: Cigarettes    Smokeless tobacco: Never   Substance Use Topics    Alcohol use: Never    Drug use: Never     MEDICATIONS:  amiodarone, 200 mg, oral, Daily  influenza, 0.7 mL, intramuscular, During hospitalization  heparin, 2,000 Units, intra-catheter, After Dialysis  heparin, 2,000 Units, intra-catheter, After Dialysis  levoFLOXacin, 250 mg, intravenous, q48h  levothyroxine, 112 mcg, oral, Daily  metoprolol tartrate, 50 mg, oral, BID  warfarin, 5 mg, oral, Once  warfarin, 5 mg, oral, Once           PRN medications: acetaminophen, melatonin   CURRENT ALLERGIES:   Allergies as of 11/29/2023 - Reviewed 11/29/2023   Allergen Reaction Noted    Amoxicillin Hives 09/07/2023    Azithromycin Diarrhea 09/07/2023    Lisinopril Cough 09/07/2023    Amlodipine besylate Rash 09/07/2023    Ampicillin Rash 09/07/2023    Carvedilol Rash 09/07/2023    Pravastatin sodium Anxiety 09/07/2023       COMPLETE REVIEW OF SYSTEMS:    Full ROS was negative unless mentioned above    OBJECTIVE  PHYSICAL EXAM  Heart Rate:  [59-61]   Temp:  [35.6 °C (96.1 °F)-36.7 °C (98.1 °F)]   Resp:  [14-16]   BP: (101-132)/(33-60)   Height:  [157.5 cm (5' 2\")]   Weight:  [74.5 kg (164 lb 3.9 oz)-78.4 kg (172 lb 13.5 oz)]   SpO2:  [93 %-98 %]    Body mass index is 31.61 kg/m².  This is an elderly white woman who appears in no acute distress  No obvious skin rashes, somewhat pale  Hearing intact  Phonation intact  Moist mucosa  Normal S1/normal S2  Lungs are clear to auscultation bilaterally  Abdomen is soft, " nondistended, nontender, positive bowel sounds  No Reyna catheter in place, no suprapubic tenderness to palpation  No extremity edema  Moves 4 limbs spontaneously  No obvious joint deformities  No lymphadenopathy  Right internal jugular tunneled hemodialysis catheter in place    DATA:   Labs:  Results for orders placed or performed during the hospital encounter of 11/29/23 (from the past 96 hour(s))   Comprehensive metabolic panel   Result Value Ref Range    Glucose 81 65 - 99 mg/dL    Sodium 135 133 - 145 mmol/L    Potassium 4.9 3.4 - 5.1 mmol/L    Chloride 97 97 - 107 mmol/L    Bicarbonate 23 (L) 24 - 31 mmol/L    Urea Nitrogen 44 (H) 8 - 25 mg/dL    Creatinine 7.90 (H) 0.40 - 1.60 mg/dL    eGFR 5 (L) >60 mL/min/1.73m*2    Calcium 8.9 8.5 - 10.4 mg/dL    Albumin 3.6 3.5 - 5.0 g/dL    Alkaline Phosphatase 72 35 - 125 U/L    Total Protein 7.2 5.9 - 7.9 g/dL    AST 29 5 - 40 U/L    Bilirubin, Total 0.6 0.1 - 1.2 mg/dL    ALT 17 5 - 40 U/L    Anion Gap 15 <=19 mmol/L   Lipase   Result Value Ref Range    Lipase 17 16 - 63 U/L   NT Pro-BNP   Result Value Ref Range    PROBNP 11,828 (H) 0 - 624 pg/mL   Serial Troponin, Initial ([facility])   Result Value Ref Range    Troponin T, High Sensitivity 102 (H) <=15 ng/L   Urinalysis with Reflex Microscopic   Result Value Ref Range    Color, Urine Dark-Orange (N) Light-Yellow, Yellow, Dark-Yellow    Appearance, Urine Ex.Turbid (N) Clear    Specific Gravity, Urine 1.010 1.005 - 1.035    pH, Urine 7.5 5.0, 5.5, 6.0, 6.5, 7.0, 7.5, 8.0    Protein, Urine 100 (2+) (A) NEGATIVE, 10 (TRACE), 20 (TRACE) mg/dL    Glucose, Urine Normal Normal mg/dL    Blood, Urine 0.06 (1+) (A) NEGATIVE    Ketones, Urine NEGATIVE NEGATIVE mg/dL    Bilirubin, Urine NEGATIVE NEGATIVE    Urobilinogen, Urine Normal Normal mg/dL    Nitrite, Urine NEGATIVE NEGATIVE    Leukocyte Esterase, Urine 500 Yaneth/µL (A) NEGATIVE   SARS-CoV-2 RT PCR   Result Value Ref Range    Coronavirus 2019, PCR Not Detected Not Detected    Influenza A, and B PCR   Result Value Ref Range    Flu A Result Not Detected Not Detected    Flu B Result Not Detected Not Detected   Microscopic Only, Urine   Result Value Ref Range    WBC, Urine >50 (A) 1-5, NONE /HPF    WBC Clumps, Urine MANY Reference range not established. /HPF    RBC, Urine >20 (A) NONE, 1-2, 3-5 /HPF    Squamous Epithelial Cells, Urine 1-9 (SPARSE) Reference range not established. /HPF   CBC and Auto Differential   Result Value Ref Range    WBC 5.0 4.4 - 11.3 x10*3/uL    nRBC 0.0 0.0 - 0.0 /100 WBCs    RBC 3.58 (L) 4.00 - 5.20 x10*6/uL    Hemoglobin 10.5 (L) 12.0 - 16.0 g/dL    Hematocrit 33.1 (L) 36.0 - 46.0 %    MCV 93 80 - 100 fL    MCH 29.3 26.0 - 34.0 pg    MCHC 31.7 (L) 32.0 - 36.0 g/dL    RDW 17.2 (H) 11.5 - 14.5 %    Platelets 58 (L) 150 - 450 x10*3/uL    Neutrophils % 51.3 40.0 - 80.0 %    Immature Granulocytes %, Automated 0.2 0.0 - 0.9 %    Lymphocytes % 31.5 13.0 - 44.0 %    Monocytes % 6.3 2.0 - 10.0 %    Eosinophils % 8.9 0.0 - 6.0 %    Basophils % 1.8 0.0 - 2.0 %    Neutrophils Absolute 2.58 1.60 - 5.50 x10*3/uL    Immature Granulocytes Absolute, Automated 0.01 0.00 - 0.50 x10*3/uL    Lymphocytes Absolute 1.59 0.80 - 3.00 x10*3/uL    Monocytes Absolute 0.32 0.05 - 0.80 x10*3/uL    Eosinophils Absolute 0.45 (H) 0.00 - 0.40 x10*3/uL    Basophils Absolute 0.09 0.00 - 0.10 x10*3/uL   Serial Troponin, 2 Hour ([facility])   Result Value Ref Range    Troponin T, High Sensitivity 98 (H) <=15 ng/L   Morphology   Result Value Ref Range    RBC Morphology No significant RBC morphology present    Protime-INR   Result Value Ref Range    Protime 12.4 9.3 - 12.7 seconds    INR 1.2 0.9 - 1.2   Serial Troponin, 6 Hour ([facility])   Result Value Ref Range    Troponin T, High Sensitivity 95 (H) <=15 ng/L   ECG 12 lead   Result Value Ref Range    Ventricular Rate 60 BPM    QRS Duration 160 ms    QT Interval 516 ms    QTC Calculation(Bazett) 516 ms    R Axis 66 degrees    T Axis 28 degrees    QRS Count 9  beats    Q Onset 222 ms    T Offset 480 ms    QTC Fredericia 516 ms   Protime-INR   Result Value Ref Range    Protime 12.6 9.3 - 12.7 seconds    INR 1.2 0.9 - 1.2   Renal Function Panel   Result Value Ref Range    Glucose 78 65 - 99 mg/dL    Sodium 135 133 - 145 mmol/L    Potassium 3.7 3.4 - 5.1 mmol/L    Chloride 96 (L) 97 - 107 mmol/L    Bicarbonate 28 24 - 31 mmol/L    Urea Nitrogen 16 8 - 25 mg/dL    Creatinine 3.90 (H) 0.40 - 1.60 mg/dL    eGFR 11 (L) >60 mL/min/1.73m*2    Calcium 8.5 8.5 - 10.4 mg/dL    Phosphorus 3.2 2.5 - 4.5 mg/dL    Albumin 3.3 (L) 3.5 - 5.0 g/dL    Anion Gap 11 <=19 mmol/L   CBC   Result Value Ref Range    WBC 4.8 4.4 - 11.3 x10*3/uL    nRBC 0.0 0.0 - 0.0 /100 WBCs    RBC 3.50 (L) 4.00 - 5.20 x10*6/uL    Hemoglobin 10.4 (L) 12.0 - 16.0 g/dL    Hematocrit 30.8 (L) 36.0 - 46.0 %    MCV 88 80 - 100 fL    MCH 29.7 26.0 - 34.0 pg    MCHC 33.8 32.0 - 36.0 g/dL    RDW 16.5 (H) 11.5 - 14.5 %    Platelets 60 (L) 150 - 450 x10*3/uL   Magnesium   Result Value Ref Range    Magnesium 2.00 1.60 - 3.10 mg/dL       SIGNATURE: Soren Pagan MD PATIENT NAME: Noemi Man   DATE: November 30, 2023 MRN: [id number]   TIME: 3:53 PM PAGER: [phone]

## 2023-11-30 NOTE — PROGRESS NOTES
Occupational Therapy    Evaluation    Patient Name: Noemi Man  MRN: [id number]  Today's Date: 11/30/2023  Time Calculation  Start Time: 0933  Stop Time: 0947  Time Calculation (min): 14 min    Assessment  IP OT Assessment  OT Assessment: Patient is an 82 year old female admitted with UTI and cdiff. Patient is presenting with a decline in strength, balance, activity tolerance, and function, resulting in an increased need for assistance with ADL tasks. Skilled OT to address the above deficits and increase patient's safety and independence with daily tasks.  Prognosis: Good  Evaluation/Treatment Tolerance: Patient tolerated treatment well  End of Session Communication: Bedside nurse  End of Session Patient Position: Up in chair  Plan:  Treatment Interventions: ADL retraining, Functional transfer training, UE strengthening/ROM, Endurance training, Patient/family training, Neuromuscular reeducation, Compensatory technique education  OT Frequency: 2 times per week  OT Discharge Recommendations: Low intensity level of continued care, 24 hr supervision due to cognition  OT Recommended Transfer Status: Minimal assist, Assist of 1  OT - OK to Discharge: Yes    Subjective   Current Problem:  1. UTI (urinary tract infection)          General:  General  Reason for Referral: decline in ADLs  Referred By: Dr. Sulma MD  Past Medical History Relevant to Rehab: Patient is an 82 year old female admitted from home with c/o diarrhea, weakness, and missed dialysis. (+) UTI. PMH: Afib, thyroid gland, ESRD, HD, HTN, AAA repair, ophelia, pacemaker  Prior to Session Communication: Bedside nurse  Patient Position Received: Up in chair  Preferred Learning Style: verbal  General Comment: Patient cleared by nursing for therapy. Patient is seated in the chair upon arrival and agreeable to participate  Precautions:  Hearing/Visual Limitations: [place]; glasses  Medical Precautions: Fall precautions, Infection precautions  (+cdiff)  Pain:  Pain Assessment  Pain Assessment: 0-10  Pain Score: 0 - No pain    Objective   Cognition:  Overall Cognitive Status: Within Functional Limits  Insight: Mild    Home Living:  Type of Home: Condo  Lives With: Adult children (daughter)  Home Adaptive Equipment: Walker rolling or standard  Home Layout: One level  Home Access: Level entry  Bathroom Shower/Tub: Tub/shower unit  Bathroom Toilet: Standard  Bathroom Equipment: Grab bars in shower, Shower chair with back   Prior Function:  Level of [place]: Independent with ADLs and functional transfers, Independent with homemaking with ambulation  Receives Help From: Family  ADL Assistance: Independent  Homemaking Assistance: Independent (daughter performs some iADLs; pt does own laundry, some cooking)  Ambulatory Assistance: Independent (mod indep with use of RW)  Vocational: Retired  Prior Function Comments: h/o one fall  IADL History:  Homemaking Responsibilities: No  IADL Comments: daughter completes  ADL:  Eating Assistance: Independent  Eating Deficit: Setup (seated with items within reach)  Grooming Assistance: Stand by  Grooming Deficit: Steadying, Supervision/safety (per clinical judgement)  Bathing Assistance: Minimal  Bathing Deficit: Right lower leg including foot, Left lower leg including foot, Increased time to complete , Supervision/safety, Setup, Steadying (per clinical judgement)  UE Dressing Assistance: Stand by  UE Dressing Deficit: Setup (per clinical judgement)  LE Dressing Assistance: Minimal  LE Dressing Deficit: Setup, Requires assistive device for steadying, Steadying, Supervision/safety, Increased time to complete (per clinical judgement)  Toileting Assistance with Device: Minimal  Toileting Deficit: Perineal hygiene, Clothing management down, Clothing management up, Steadying, Supervison/safety (per clinical judgement)  Activity Tolerance:  Endurance: Decreased tolerance for upright activites  Bed Mobility/Transfers: Bed  Mobility  Bed Mobility: No (patient up in the chair and remains in the chair)    Transfers  Transfer: Yes  Transfer 1  Transfer From 1: Chair with arms to  Transfer to 1: Stand  Technique 1: Sit to stand  Transfer Device 1: Walker  Transfer Level of Assistance 1: Contact guard  Trials/Comments 1: cues for proper hand placement. functional mobility within room with CGA for safety    IADL's:   Homemaking Responsibilities: No  IADL Comments: daughter completes  Vision:    Sensation:  Sensation Comment: patient reports numbness/tingling in both feet and L hand  Strength:  Strength Comments: L UE impaired. R UE 3+/5 grossly  Coordination:  Movements are Fluid and Coordinated: Yes   Extremities: RUE   RUE : Exceptions to WFL (decreased shoulder ROM. patient reports chronic deficit) and LUE   LUE: Within Functional Limits    Outcome Measures: [facility] Daily Activity  Putting on and taking off regular lower body clothing: A little  Bathing (including washing, rinsing, drying): A little  Putting on and taking off regular upper body clothing: A little  Toileting, which includes using toilet, bedpan or urinal: A little  Taking care of personal grooming such as brushing teeth: A little  Eating Meals: None  Daily Activity - Total Score: 19    Education Documentation  Body Mechanics, taught by Laura Somers OT at 11/30/2023 11:11 AM.  Learner: Patient  Readiness: Acceptance  Method: Explanation, Demonstration  Response: Needs Reinforcement    Precautions, taught by Laura Somers OT at 11/30/2023 11:11 AM.  Learner: Patient  Readiness: Acceptance  Method: Explanation, Demonstration  Response: Needs Reinforcement    Education Comments  No comments found.      Goals:   Encounter Problems       Encounter Problems (Active)       OT Goals       ADLs (Progressing)       Start:  11/30/23    Expected End:  12/14/23       Patient will complete ADL tasks with Mod I, using AE as needed, in order to increase safety and independence with  self-care tasks.         Functional Transfers (Progressing)       Start:  11/30/23    Expected End:  12/14/23       Patient will complete functional transfers with Mod I in order to increase safety and independence with daily tasks.         B UE Strengthening (Progressing)       Start:  11/30/23    Expected End:  12/14/23       Patient will increase B UE strength to 4+/5 for functional transfers.         Functional Mobility (Progressing)       Start:  11/30/23    Expected End:  12/14/23       Patient will demonstrate the ability to complete item retrieval and functional mobility with Mod I in order to increase safety and independence with ADL tasks.

## 2023-11-30 NOTE — NURSING NOTE
1757 - AVS reviewed with patient and neighbor, all questions answered and IV removed. Belongings returned and patient discharged by wheelchair.

## 2023-11-30 NOTE — CARE PLAN
Problem: Nutrition  Goal: Less than 5 days NPO/clear liquids  Outcome: Progressing  Goal: Oral intake greater than 50%  Outcome: Progressing  Goal: Oral intake greater 75%  Outcome: Progressing  Goal: Consume prescribed supplement  Outcome: Progressing  Goal: Adequate PO fluid intake  Outcome: Progressing  Goal: Nutrition support goals are met within 48 hrs  Outcome: Progressing  Goal: Nutrition support is meeting 75% of nutrient needs  Outcome: Progressing  Goal: Tube feed tolerance  Outcome: Progressing  Goal: BG  mg/dL  Outcome: Progressing  Goal: Lab values WNL  Outcome: Progressing  Goal: Electrolytes WNL  Outcome: Progressing  Goal: Promote healing  Outcome: Progressing  Goal: Maintain stable weight  Outcome: Progressing  Goal: Reduce weight from edema/fluid  Outcome: Progressing  Goal: Gradual weight gain  Outcome: Progressing  Goal: Improve ostomy output  Outcome: Progressing     Problem: Discharge Planning  Goal: Discharge to home or other facility with appropriate resources  Outcome: Progressing     Problem: Chronic Conditions and Co-morbidities  Goal: Patient's chronic conditions and co-morbidity symptoms are monitored and maintained or improved  Outcome: Progressing     Problem: Skin  Goal: Decreased wound size/increased tissue granulation at next dressing change  Outcome: Progressing  Goal: Participates in plan/prevention/treatment measures  Outcome: Progressing  Goal: Prevent/manage excess moisture  Outcome: Progressing  Goal: Prevent/minimize sheer/friction injuries  Outcome: Progressing  Goal: Promote/optimize nutrition  Outcome: Progressing  Goal: Promote skin healing  Outcome: Progressing     Problem: Fall/Injury  Goal: Not fall by end of shift  Outcome: Progressing  Goal: Be free from injury by end of the shift  Outcome: Progressing  Goal: Verbalize understanding of personal risk factors for fall in the hospital  Outcome: Progressing  Goal: Verbalize understanding of risk factor reduction  measures to prevent injury from fall in the home  Outcome: Progressing  Goal: Use assistive devices by end of the shift  Outcome: Progressing  Goal: Pace activities to prevent fatigue by end of the shift  Outcome: Progressing   The patient's goals for the shift include      The clinical goals for the shift include Monitor blood pressure    Over the shift, the patient did not make progress toward the following goals. Barriers to progression include n/a. Recommendations to address these barriers include n/a.

## 2023-11-30 NOTE — PROGRESS NOTES
[facility] spoke with the pt by phone. Pt states she lives with her daughter in a one story condo, no steps to enter. Pt uses a walker. Pt wears glasses, states she is in the process of getting hearing aids. Pt denies MH Hx. Pt denies financial, housing or food issues. Pts PCP is Dr Maldonado, prescriptions filled through "[facility] [place]. Discussion around dc planning needs with pt denying need for HHC. [facility] mentioned the issue the pt has had with regards to taking her coumadin and getting appropriate lab work done. [facility] explained to pt that she can come to [facility] to get her lab work done, she states she has been coming here and states someone by the name of Nicki has been doing something for her here. [facility] has call out to the coumadin clinic to see what they need from the attending Dr so that the pt can get lab work done there, message left. [facility] has attempted to reach pts daughter Nicki as well, called her at [phone] and [phone] and received no answer.

## 2023-11-30 NOTE — CONSULTS
Nutrition Assessment Note    Reason for Hospital Admission:  Noemi Man is a 82 y.o. female who is admitted for UTI and presents with generalized malaise and fatigue. Pt has ESRD on hemodialysis - missed Monday dialysis appt d/t not feeling well and received dialysis yesterday. Pt reports prolonged poor po intake d/t loss of taste - pt agreeable to magic cup TID to improve intake.    Nutrition Assessment:  Reason for Assessment  Reason for Assessment: Admission nursing screening (MST 4)     has a past medical history of A-fib (CMS/Formerly [facility]), Disease of thyroid gland, ESRD (end stage renal disease) (CMS/Formerly [facility]), Hemodialysis access site with arteriovenous graft (CMS/Formerly [facility]), Hypertension, and Renal disorder.     Allergies   Allergen Reactions    Amoxicillin Hives    Azithromycin Diarrhea    Lisinopril Cough    Amlodipine Besylate Rash    Ampicillin Rash    Carvedilol Rash    Pravastatin Sodium Anxiety        Lab Results   Component Value Date    WBC 4.8 11/30/2023    HGB 10.4 (L) 11/30/2023    HCT 30.8 (L) 11/30/2023    PLT 60 (L) 11/30/2023    CHOL 180 03/24/2022    TRIG 117 03/24/2022    HDL 49 (L) 03/24/2022    ALT 17 11/29/2023    AST 29 11/29/2023     11/30/2023    K 3.7 11/30/2023    CL 96 (L) 11/30/2023    CREATININE 3.90 (H) 11/30/2023    BUN 16 11/30/2023    CO2 28 11/30/2023    .60 (H) 02/01/2023    INR 1.2 11/30/2023    HGBA1C 6.0 10/05/2021       Current Facility-Administered Medications:     acetaminophen (Tylenol) tablet 650 mg, 650 mg, oral, q6h PRN, Tia Ozuna DO    amiodarone (Pacerone) tablet 200 mg, 200 mg, oral, Daily, Tia Ozuna DO, 200 mg at 11/30/23 0918    flu vaccine, quadrivalent, high-dose, preservative free, age 65y+ (FLUZONE), 0.7 mL, intramuscular, During hospitalization, Tia Ozuna DO    heparin 1,000 unit/mL injection 2,000 Units, 2,000 Units, intra-catheter, After Dialysis, Tia Ozuna DO, 1,800 Units at 11/29/23 3889    heparin 1,000 unit/mL  "injection 2,000 Units, 2,000 Units, intra-catheter, After Dialysis, Tia Ozuna DO, 1,600 Units at 11/29/23 1857    levoFLOXacin (Levaquin)  mg, 250 mg, intravenous, q48h, Tia Ozuna DO, Stopped at 11/29/23 2200    levothyroxine (Synthroid, Levoxyl) tablet 112 mcg, 112 mcg, oral, Daily, Tia Ozuna DO, 112 mcg at 11/30/23 0600    melatonin tablet 3 mg, 3 mg, oral, Nightly PRN, Tia Ozuna DO    metoprolol tartrate (Lopressor) tablet 50 mg, 50 mg, oral, BID, Tia Ozuna DO, 50 mg at 11/29/23 2014    warfarin (Coumadin) tablet 5 mg, 5 mg, oral, Once, Tia Ozuna DO    warfarin (Coumadin) tablet 5 mg, 5 mg, oral, Once, Tia Ozuna DO    Dietary Orders (From admission, onward)       Start     Ordered    11/30/23 1238  Oral nutritional supplements  Until discontinued        Comments: Chocolate   Question Answer Comment   Deliver with All meals    Select supplement: Magic Cup        11/30/23 1237    11/29/23 2042  May Participate in Room Service  Once        Question:  .  Answer:  Yes    11/29/23 2041 11/29/23 1433  Adult diet Regular  Diet effective now        Question:  Diet type  Answer:  Regular    11/29/23 1432                  History:  Food and Nutrient History  Energy Intake: Poor < 50 %  Food and Nutrient History: Pt reports she has not eaten well for the past 7 months d/t losing sense of taste    Anthropometrics:  Ht: 157.5 cm (5' 2\"), Wt: 78.4 kg (172 lb 13.5 oz), BMI: 31.6    Weight Change  Weight History / % Weight Change: Pt reports she lost 40 lb over the past 7 months d/t poor po intake. Pt reports she does not want to gain wt back and would like to continue to lose wt. Pt states her current wt is 155 lb  Significant Weight Loss: Yes  Interpretation of Weight Loss:  (20.5% wt loss over the past 7 months)    IBW/kg (Dietitian Calculated): 50 kg       Nutrition Prescription  Estimated Energy Needs  Total Energy Estimated Needs (kCal): 1500 " kCal  Total Estimated Energy Need per Day (kCal/kg): 30 kCal/kg  Method for Estimating Needs: IBW    Estimated Protein Needs  Total Protein Estimated Needs (g):  (60-65)  Total Protein Estimated Needs (g/kg):  (1.2-1.3)  Method for Estimating Needs: IBW    Estimated Fluid Needs  Method for Estimating Needs: UO +500 mL    Nutrition Focused Physical Findings:   Subcutaneous Fat Loss  Orbital Fat Pads: Defer (Pt in contact isolation at time of visit)    Nutrition Diagnosis:  Malnutrition Diagnosis  Patient has Malnutrition Diagnosis: Yes  Diagnosis Status: New  Malnutrition Diagnosis: Severe malnutrition related to chronic disease or condition  As Evidenced by: prolonged poor po intake and 20.5% wt loss over the past 7 months    Patient has Nutrition Diagnosis: Yes  Nutrition Diagnosis 1: Increased nutrient needs  Diagnosis Status (1): New  Related to (1): Increased need for nutrients  As Evidenced by (1): Hemodialysis     Nutrition Interventions/Recommendations:     Food and/or Nutrient Delivery Interventions  Interventions: Meals and snacks, Medical food supplement    Meals and Snacks: General healthful diet  Goal: provide as ordered    Medical Food Supplement: Commercial food  Goal: magic cup TID    Education Documentation  No documentation found.    Nutrition Monitoring/Evaluation:  Food and Nutrient Related History  Energy Intake: Estimated energy intake  Criteria: Pt will consume >/= 75% of estimated energy needs     Anthropometrics: Body Composition/Growth/Weight History  Weight: Measured weight  Criteria: Pt will maintain wt    RD Recommendations for Malnutrition: Recommend patient consumes a high calorie/high protein nutrition supplement 2-3 times daily to aid in weight gain/prevent weight loss after discharge.      Follow Up  Time Spent (min): 30 minutes  Last Date of Nutrition Visit: 11/30/23  Nutrition Follow-Up Needed?: 3-5 days  Follow up Comment: 12/4/23

## 2023-11-30 NOTE — CARE PLAN
The patient's goals for the shift include get better     The clinical goals for the shift include Safety, monitor blood pressure    Over the shift, the patient did make progress toward the following goals. Barriers to progression include none. Recommendations to address these barriers include assist with ADLs.
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
## 2023-11-30 NOTE — DISCHARGE SUMMARY
Discharge Diagnosis  UTI (urinary tract infection)    Issues Requiring Follow-Up  Coumadin levels- need to follow up with coumadin clinic or Dr. Montgomery        Discharge Meds     Your medication list        START taking these medications        Instructions Last Dose Given Next Dose Due   warfarin 2.5 mg tablet  Commonly known as: Coumadin      Take 1 tablet (2.5 mg) by mouth once daily at bedtime. Take as directed per After Visit Summary.       levoFLOXacin 250 mg tablet  Commonly known as: Levaquin      Take 1 tablet (250 mg) by mouth every other day for 3 doses.              CONTINUE taking these medications        Instructions Last Dose Given Next Dose Due   amiodarone 200 mg tablet  Commonly known as: Pacerone      Take 1 tablet (200 mg) by mouth once daily.       levothyroxine 125 mcg tablet  Commonly known as: Synthroid, Levoxyl           metoprolol tartrate 50 mg tablet  Commonly known as: Lopressor                     Where to Get Your Medications        These medications were sent to [id number] - [place] [address] [facility] [address]  [address], [place] [address] [facility] [address]      Phone: [phone]   levoFLOXacin 250 mg tablet  warfarin 2.5 mg tablet         Test Results Pending At Discharge  Pending Labs       No current pending labs.            Hospital Course   83yo woman with history of ESRD on HD and afib that came in after missing two episodes of dialysis. She was having chills and diarrhea at home, prompting her not to go to HD. In the ER, she was found to have a UTI which was treated. Her diarrhea and chills resolved quickly. She underwent HD on Wednesday afternoon. She saw Dr. Pagan who cleared her for DC on her normal dialysis schedule of [facility]. She was seen by PT/OT who recommended low needs.    Patient admitted to stopping her coumadin because it was causing bruises. She told me she did not know that it was to prevent strokes and reported she wanted to take it again.  Coumadin was restarted and patient needs to follow up with coumadin clinic. She is to have INR checked Monday and can follow up with coumadin clinic or Dr. Montgomery for this.     Pertinent Physical Exam At Time of Discharge  Physical Exam  General: alert, no diaphoresis   Lungs: CTA BL   Heart: RRR, no murmurs, no LE edema BL   GI: abdomen soft, nontender, nondistended, BS present   MSK: no joint effusion or deformity   Skin: no rashes, erythema, or ecchymosis   Neuro: grossly normal cognition, motor strength, sensation    Outpatient Follow-Up  Future Appointments   Date Time Provider Department Center   1/9/2024  8:00 AM [facility] VASCULAR 1 [id number] [facility]   1/9/2024  9:00 AM [facility] VASCULAR 2 [id number] [facility]   1/9/2024 10:00 AM Bertha Quintero MD [id number] None   2/1/2024  3:30 PM Santiago Maldonado MD [id number] [facility]     Discharge time spent: 35 min    Tia Ozuna DO

## 2023-12-06 ENCOUNTER — PATIENT OUTREACH (OUTPATIENT)
Dept: CARE COORDINATION | Facility: CLINIC | Age: 83
End: 2023-12-06
Payer: MEDICARE

## 2023-12-06 NOTE — CARE PLAN
At this time, this  has made a 3rd attempt at reaching patient's daughter for a follow up.  Patient's daughter was connect to Tomasz HERRERA from [facility]. Living Locators/Tomasz was unable to connect with this patient's daughter.  This  is unable to update care plan.  This  will close this case at this time.  If patient needs assistance in the future, please refer.  Sruthi Franco MSW LSW   12/6/23.  Problem: Coordination of Community Resources Needed  Goal: Coordination of Services will be Obtained  Outcome: Not met     Problem: Coordination of Psychosocial Support Services Needed  Goal: Coordination of Services will be Obtained  Outcome: Not met     Problem: Coordination of Community Resources Needed  Goal: Coordination of Services will be Obtained  Outcome: Not met  Intervention: Coordinate care to local community resources

## 2023-12-08 ENCOUNTER — PATIENT OUTREACH (OUTPATIENT)
Dept: PRIMARY CARE | Facility: CLINIC | Age: 83
End: 2023-12-08
Payer: MEDICARE

## 2023-12-08 NOTE — PROGRESS NOTES
Attempted to reach patient's daughter Nicki for monthly outreach to follow up on mom Noemi & was not successful. Left voicemail message with request to return my call. Per review of medical records patient had been followed by Social Work & case closed due to lack of response. Patient had recent Observation admit 11/29 -11/30 GI issues & urinary tract infection. Will await response. If no return call, will try again within the week.

## 2023-12-11 ENCOUNTER — HOSPITAL ENCOUNTER (OUTPATIENT)
Dept: CARDIOLOGY | Facility: CLINIC | Age: 83
Discharge: HOME | End: 2023-12-11
Payer: MEDICARE

## 2023-12-12 ENCOUNTER — PATIENT OUTREACH (OUTPATIENT)
Dept: PRIMARY CARE | Facility: CLINIC | Age: 83
End: 2023-12-12
Payer: MEDICARE

## 2023-12-12 DIAGNOSIS — N18.6 CHRONIC KIDNEY DISEASE WITH END STAGE RENAL FAILURE ON DIALYSIS (MULTI): ICD-10-CM

## 2023-12-12 DIAGNOSIS — Z99.2 CHRONIC KIDNEY DISEASE WITH END STAGE RENAL FAILURE ON DIALYSIS (MULTI): ICD-10-CM

## 2023-12-12 DIAGNOSIS — I50.9 CONGESTIVE HEART FAILURE, UNSPECIFIED HF CHRONICITY, UNSPECIFIED HEART FAILURE TYPE (MULTI): ICD-10-CM

## 2023-12-12 PROCEDURE — 99490 CHRNC CARE MGMT STAFF 1ST 20: CPT | Performed by: INTERNAL MEDICINE

## 2023-12-12 NOTE — PROGRESS NOTES
This  has left several messages for daughter Nicki Man, [phone], who lives with patient with no response. Contacted patient at home number & call answered by daughter, Ida who lives in [place] ([phone]). Informed this  that sister Nicki passed away unexpectedly 12/6 after being hospitalized for over a week. Ida is in town until Friday making arrangements for assistance for  Noemi. Additional support provided by patient's sister, Lorelei Powers, [phone], who lives a few streets away. Family has been in touch with [facility] & patient will be receiving Meals on Wheels. Plans to have additional services to assist patient with housework & other needed help is in progress. Patient continues to receive dialysis M/W/F at [facility] in [place]. Per daughter, plans to move Noemi closer to family in [place] within the next few months. This  provided contact information to daughter who will reach out for additional help for transfer. Sruthi COLE was also involved in this case & has been updated on recent events. Ida asked if this  would call Noemi next week to ensure all needs were being met. Will reach out to patient next week.

## 2023-12-21 ENCOUNTER — ANTICOAGULATION - WARFARIN VISIT (OUTPATIENT)
Dept: CARDIOLOGY | Facility: HOSPITAL | Age: 83
End: 2023-12-21
Payer: MEDICARE

## 2023-12-21 DIAGNOSIS — I48.20 CHRONIC ATRIAL FIBRILLATION (MULTI): Primary | ICD-10-CM

## 2023-12-21 LAB
POC INR: 1.2
POC INR: 1.2
POC PROTHROMBIN TIME: NORMAL
POC PROTHROMBIN TIME: NORMAL

## 2023-12-21 PROCEDURE — 99211 OFF/OP EST MAY X REQ PHY/QHP: CPT | Performed by: INTERNAL MEDICINE

## 2023-12-21 NOTE — PROGRESS NOTES
Patient identification verified with 2 identifiers.    Location: [facility] - 1st Floor Anticoagulation Clinic [address], [place] [address]    Referring Physician: Dr Montgomery  Enrollment/ Re-enrollment date:    INR Goal: 2.0-3.0  INR monitoring is per [facility] protocol.  Anticoagulation Medication: Jantoven  Indication: Atrial Fibrillation/Atrial Flutter    Subjective   Bleeding signs/symptoms: No    Bruising: Yes  complains of easy bruising so stopped coumadin   Major bleeding event: No  Thrombosis signs/symptoms: No  Thromboembolic event: No  Missed doses: Yes  stopped taking due to bruising  Extra doses: No  Medication changes: No  Dietary changes: No  Change in health: No  Change in activity: No  Alcohol: No  Other concerns: No    Upcoming Surgeries:  Does the Patient Have any upcoming surgeries that require interruption in anticoagulation therapy? no  Does the patient require bridging? no      Anticoagulation Summary  As of 2023      INR goal:  2.0-3.0   TTR:  --   INR used for dosin.20 (2023)   Weekly warfarin total:  8.75 mg               Assessment/Plan   Subtherapeutic     1. New dose:  return to stated dosing of 1.25mg daily     2. Next INR: 1 week      Education provided to patient during the visit:  Patient instructed to call in interim with questions, concerns and changes.   Patient educated on compliance with dosing, follow up appointments, and prescribed plan of care.

## 2023-12-21 NOTE — PROGRESS NOTES
Patient identification verified with 2 identifiers.    Location: [facility] - 1st Floor Anticoagulation Clinic [address] [place] [id number]    Referring Physician: Dr. Montgomery  Enrollment/ Re-enrollment date: 2024   INR Goal: 2.0-3.0  INR monitoring is per [facility] protocol.  Anticoagulation Medication: Jantoven  Indication: Atrial Fibrillation/Atrial Flutter    Subjective   Bleeding signs/symptoms: No    Bruising: Yes  stopped taking on her own due to bruising   Major bleeding event: No  Thrombosis signs/symptoms: No  Thromboembolic event: No  Missed doses: Yes  has not been taking unclear for how long  Extra doses: No  Medication changes: No  Dietary changes: No  Change in health: No  Change in activity: No  Alcohol: No  Other concerns: No    Upcoming Surgeries:  Does the Patient Have any upcoming surgeries that require interruption in anticoagulation therapy? no  Does the patient require bridging? no      Anticoagulation Summary  As of 12/21/2023      INR goal:  2.0-3.0   TTR:  --   INR used for dosing:                 Assessment/Plan   Subtherapeutic     1. New dose:  dosing added to be what patient was on previously     2. Next INR: 1 week      Education provided to patient during the visit:  Patient instructed to call in interim with questions, concerns and changes.   Patient educated on compliance with dosing, follow up appointments, and prescribed plan of care.

## 2023-12-28 ENCOUNTER — APPOINTMENT (OUTPATIENT)
Dept: CARDIOLOGY | Facility: HOSPITAL | Age: 83
End: 2023-12-28
Payer: MEDICARE

## 2023-12-28 ENCOUNTER — ANTICOAGULATION - WARFARIN VISIT (OUTPATIENT)
Dept: CARDIOLOGY | Facility: HOSPITAL | Age: 83
End: 2023-12-28
Payer: MEDICARE

## 2023-12-28 DIAGNOSIS — I48.20 CHRONIC ATRIAL FIBRILLATION (MULTI): Primary | ICD-10-CM

## 2023-12-28 LAB
POC INR: 1.2
POC PROTHROMBIN TIME: NORMAL

## 2023-12-28 PROCEDURE — 99211 OFF/OP EST MAY X REQ PHY/QHP: CPT | Performed by: INTERNAL MEDICINE

## 2023-12-28 NOTE — PROGRESS NOTES
Patient identification verified with 2 identifiers.    Location: [facility] - 1st Floor Anticoagulation Clinic [address] [address], [place] [address]    Referring Physician: Dr. Montgomery  Enrollment/ Re-enrollment date:    INR Goal: 2.0-3.0  INR monitoring is per [facility] protocol.  Anticoagulation Medication: warfarin  Indication: Atrial Fibrillation/Atrial Flutter    Subjective   Bleeding signs/symptoms: No    Bruising: No   Major bleeding event: No  Thrombosis signs/symptoms: No  Thromboembolic event: No  Missed doses: No  states missed 2 days  Extra doses: No  Medication changes: No  Dietary changes: No  Change in health: No  Change in activity: No  Alcohol: No  Other concerns: No    Upcoming Surgeries:  Does the Patient Have any upcoming surgeries that require interruption in anticoagulation therapy? no  Does the patient require bridging? no      Anticoagulation Summary  As of 2023      INR goal:  2.0-3.0   TTR:  --   INR used for dosin.20 (2023)   Weekly warfarin total:  10 mg               Assessment/Plan   Subtherapeutic     1. New dose:  dose increased     2. Next INR: 1 week      Education provided to patient during the visit:  Patient instructed to call in interim with questions, concerns and changes.   Patient educated on compliance with dosing, follow up appointments, and prescribed plan of care.    Discussed transitioning to Eliquis for ease of dosing as well as the fact she may be relocating to [place] to live with her daughter

## 2024-01-02 ENCOUNTER — DOCUMENTATION (OUTPATIENT)
Dept: PRIMARY CARE | Facility: CLINIC | Age: 84
End: 2024-01-02
Payer: MEDICARE

## 2024-01-02 ENCOUNTER — TELEPHONE (OUTPATIENT)
Dept: PRIMARY CARE | Facility: CLINIC | Age: 84
End: 2024-01-02

## 2024-01-02 DIAGNOSIS — N18.6 CHRONIC KIDNEY DISEASE WITH END STAGE RENAL FAILURE ON DIALYSIS (MULTI): ICD-10-CM

## 2024-01-02 DIAGNOSIS — Z99.2 CHRONIC KIDNEY DISEASE WITH END STAGE RENAL FAILURE ON DIALYSIS (MULTI): ICD-10-CM

## 2024-01-02 DIAGNOSIS — I50.9 CONGESTIVE HEART FAILURE, UNSPECIFIED HF CHRONICITY, UNSPECIFIED HEART FAILURE TYPE (MULTI): ICD-10-CM

## 2024-01-02 PROCEDURE — 99490 CHRNC CARE MGMT STAFF 1ST 20: CPT | Performed by: INTERNAL MEDICINE

## 2024-01-02 NOTE — PROGRESS NOTES
Received call from daughter of patient, Ida Mg, [phone] regarding plans to relocate mom to [place] to be closer to family due to unexpected death of daughter Nicki, who was primary caregiver. Ida tian will be in town for 1-2 weeks beginning 1/8/24 & wanted appointment with Dr. Maldonado to discuss medications & other medical issues prior relocation.  Ida is working with the [facility] to arrange transfer of dialysis services. This  contacted office, and spoke to Deidra who scheduled for Thursday, 1/11/24 at 1:00. Notified daughter who was grateful. Concerns include cognitive decline for Noemi, lack of caregiver services at night & limited help locally. Per Ida, will bring mom home along with treasured pets with the intention of keeping mom in home setting with family to support needs. Will continue to follow until relocation.

## 2024-01-02 NOTE — TELEPHONE ENCOUNTER
Yumiko, the nurse  called office to schedule an appt on behalf of patient's daughter Ida allen: [phone]. Yumiko states that patient's caregiver recently passed away unexpectedly and her other children are in the process of relocating patient to [place]. Appointment scheduled for 1/11.

## 2024-01-03 ENCOUNTER — DOCUMENTATION (OUTPATIENT)
Dept: CARDIOLOGY | Facility: CLINIC | Age: 84
End: 2024-01-03
Payer: MEDICARE

## 2024-01-05 ENCOUNTER — ANTICOAGULATION - WARFARIN VISIT (OUTPATIENT)
Dept: CARDIOLOGY | Facility: CLINIC | Age: 84
End: 2024-01-05
Payer: MEDICARE

## 2024-01-05 ENCOUNTER — ANTICOAGULATION - WARFARIN VISIT (OUTPATIENT)
Dept: CARDIOLOGY | Facility: HOSPITAL | Age: 84
End: 2024-01-05
Payer: MEDICARE

## 2024-01-05 DIAGNOSIS — I48.20 CHRONIC ATRIAL FIBRILLATION (MULTI): Primary | ICD-10-CM

## 2024-01-05 LAB
POC INR: 1.2
POC PROTHROMBIN TIME: NORMAL

## 2024-01-05 PROCEDURE — 85610 PROTHROMBIN TIME: CPT | Mod: QW

## 2024-01-05 PROCEDURE — 99211 OFF/OP EST MAY X REQ PHY/QHP: CPT | Performed by: INTERNAL MEDICINE

## 2024-01-05 NOTE — PROGRESS NOTES
Patient identification verified with 2 identifiers.    Location: [facility] - [address] [place] [id number] [phone]     Referring Physician: Dr. Addison Montgomery  Enrollment/ Re-enrollment date:    INR Goal: 2.0-3.0  INR monitoring is per [facility] protocol.  Anticoagulation Medication: warfarin  Indication: Atrial Fibrillation/Atrial Flutter    Subjective   Bleeding signs/symptoms: No    Bruising: No   Major bleeding event: No  Thrombosis signs/symptoms: No  Thromboembolic event: No  Missed doses: No  Extra doses: No  Medication changes: No  Dietary changes: No  Change in health: No  Change in activity: No  Alcohol: No  Other concerns: No    Upcoming Surgeries:  Does the Patient Have any upcoming surgeries that require interruption in anticoagulation therapy? no  Does the patient require bridging? no      Anticoagulation Summary  As of 2024      INR goal:  2.0-3.0   TTR:  0.0 % (5 d)   INR used for dosin.20 (2024)   Weekly warfarin total:  12.5 mg               Assessment/Plan   Subtherapeutic     1. New dose:  Increase weekly dose.      2. Next INR: 1 week      Education provided to patient during the visit:  Patient instructed to call in interim with questions, concerns and changes.   Patient educated on interactions between medications and warfarin.   Patient educated on dietary consistency in vitamin k consumption.   Patient educated on affects of alcohol consumption while taking warfarin.   Patient educated on signs of bleeding/clotting.   Patient educated on compliance with dosing, follow up appointments, and prescribed plan of care.

## 2024-01-09 ENCOUNTER — APPOINTMENT (OUTPATIENT)
Dept: VASCULAR SURGERY | Facility: CLINIC | Age: 84
End: 2024-01-09
Payer: MEDICARE
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
## 2024-01-09 PROBLEM — N18.5: Status: RESOLVED | Noted: 2023-04-21 | Resolved: 2024-01-09

## 2024-01-09 PROBLEM — R55 SYNCOPE AND COLLAPSE: Status: RESOLVED | Noted: 2022-10-15 | Resolved: 2024-01-09

## 2024-01-09 PROBLEM — N39.0 ACUTE LOWER URINARY TRACT INFECTION: Status: RESOLVED | Noted: 2023-03-15 | Resolved: 2024-01-09

## 2024-01-09 PROBLEM — N28.9 RENAL IMPAIRMENT: Status: RESOLVED | Noted: 2024-01-09 | Resolved: 2024-01-09
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
## 2024-01-09 PROBLEM — H35.3290 EXUDATIVE AGE-RELATED MACULAR DEGENERATION (MULTI): Status: RESOLVED | Noted: 2023-09-07 | Resolved: 2024-01-09

## 2024-01-09 PROBLEM — R63.4 ABNORMAL WEIGHT LOSS: Status: RESOLVED | Noted: 2022-06-30 | Resolved: 2024-01-09

## 2024-01-09 PROBLEM — M70.61 TROCHANTERIC BURSITIS OF RIGHT HIP: Status: RESOLVED | Noted: 2020-02-10 | Resolved: 2024-01-09

## 2024-01-09 PROBLEM — F17.210 CIGARETTE SMOKER: Status: RESOLVED | Noted: 2023-01-23 | Resolved: 2024-01-09

## 2024-01-09 PROBLEM — E11.22: Status: RESOLVED | Noted: 2023-04-21 | Resolved: 2024-01-09

## 2024-01-09 PROBLEM — I21.4 ACUTE NON-ST SEGMENT ELEVATION MYOCARDIAL INFARCTION (MULTI): Status: RESOLVED | Noted: 2024-01-09 | Resolved: 2024-01-09

## 2024-01-09 PROBLEM — M17.11 OSTEOARTHRITIS OF RIGHT KNEE: Status: RESOLVED | Noted: 2022-11-17 | Resolved: 2024-01-09

## 2024-01-09 PROBLEM — R10.9 ABDOMINAL PAIN: Status: RESOLVED | Noted: 2022-12-06 | Resolved: 2024-01-09

## 2024-01-09 PROBLEM — S09.90XA INJURY OF HEAD: Status: RESOLVED | Noted: 2024-01-09 | Resolved: 2024-01-09

## 2024-01-09 PROBLEM — M70.62 TROCHANTERIC BURSITIS OF LEFT HIP: Status: RESOLVED | Noted: 2020-02-10 | Resolved: 2024-01-09

## 2024-01-09 PROBLEM — R42 DIZZINESS: Status: RESOLVED | Noted: 2023-03-15 | Resolved: 2024-01-09

## 2024-01-09 PROBLEM — M75.110 PARTIAL THICKNESS ROTATOR CUFF TEAR: Status: RESOLVED | Noted: 2022-11-17 | Resolved: 2024-01-09

## 2024-01-09 PROBLEM — R58 HEMORRHAGE, NOT ELSEWHERE CLASSIFIED: Status: RESOLVED | Noted: 2024-01-09 | Resolved: 2024-01-09

## 2024-01-09 PROBLEM — R06.00 DYSPNEA: Status: RESOLVED | Noted: 2024-01-09 | Resolved: 2024-01-09

## 2024-01-09 PROBLEM — I77.89 ARTERIAL STEAL SYNDROME (CMS-HCC): Status: RESOLVED | Noted: 2024-01-09 | Resolved: 2024-01-09

## 2024-01-09 PROBLEM — R20.9 SKIN SENSATION DISTURBANCE: Status: RESOLVED | Noted: 2024-01-09 | Resolved: 2024-01-09

## 2024-01-09 PROBLEM — R30.0 DYSURIA: Status: RESOLVED | Noted: 2022-11-09 | Resolved: 2024-01-09

## 2024-01-09 PROBLEM — R25.1 TREMOR: Status: RESOLVED | Noted: 2024-01-09 | Resolved: 2024-01-09

## 2024-01-09 PROBLEM — R00.2 PALPITATIONS: Status: RESOLVED | Noted: 2024-01-09 | Resolved: 2024-01-09

## 2024-01-09 PROBLEM — G89.29 CHRONIC PAIN: Status: RESOLVED | Noted: 2024-01-09 | Resolved: 2024-01-09

## 2024-01-10 ENCOUNTER — ANTICOAGULATION - WARFARIN VISIT (OUTPATIENT)
Dept: CARDIOLOGY | Facility: CLINIC | Age: 84
End: 2024-01-10
Payer: MEDICARE

## 2024-01-10 ENCOUNTER — APPOINTMENT (OUTPATIENT)
Dept: VASCULAR MEDICINE | Facility: CLINIC | Age: 84
End: 2024-01-10
Payer: MEDICARE

## 2024-01-10 ENCOUNTER — APPOINTMENT (OUTPATIENT)
Dept: CARDIOLOGY | Facility: CLINIC | Age: 84
End: 2024-01-10
Payer: MEDICARE

## 2024-01-10 DIAGNOSIS — I48.20 CHRONIC ATRIAL FIBRILLATION (MULTI): Primary | ICD-10-CM

## 2024-01-10 LAB
POC INR: 1.1
POC PROTHROMBIN TIME: NORMAL

## 2024-01-10 PROCEDURE — 85610 PROTHROMBIN TIME: CPT | Mod: QW

## 2024-01-10 PROCEDURE — 99211 OFF/OP EST MAY X REQ PHY/QHP: CPT | Performed by: INTERNAL MEDICINE

## 2024-01-10 NOTE — PROGRESS NOTES
Patient identification verified with 2 identifiers.    Location: [facility] - [address] [place], [place] [address] [phone]     Referring Physician: Dr. Addison Montgomery  Enrollment/ Re-enrollment date:    INR Goal: 2.0-3.0  INR monitoring is per [facility] protocol.  Anticoagulation Medication: warfarin  Indication: Atrial Fibrillation/Atrial Flutter    Subjective   Bleeding signs/symptoms: No    Bruising: No   Major bleeding event: No  Thrombosis signs/symptoms: No  Thromboembolic event: No  Missed doses: No  Extra doses: No  Medication changes: No  Dietary changes: No  Change in health: No  Change in activity: No  Alcohol: No  Other concerns: No    Upcoming Surgeries:  Does the Patient Have any upcoming surgeries that require interruption in anticoagulation therapy? no  Does the patient require bridging? no      Anticoagulation Summary  As of 1/10/2024      INR goal:  2.0-3.0   TTR:  0.0 % (1.4 wk)   INR used for dosin.10 (1/10/2024)   Weekly warfarin total:  12.5 mg               Assessment/Plan   Subtherapeutic     1. New dose:  Will maintain dose as pt may have not been taking it this past week.     2. Next INR: 1 week      Education provided to patient during the visit:  Patient instructed to call in interim with questions, concerns and changes.   Patient educated on interactions between medications and warfarin.   Patient educated on dietary consistency in vitamin k consumption.   Patient educated on affects of alcohol consumption while taking warfarin.   Patient educated on signs of bleeding/clotting.   Patient educated on compliance with dosing, follow up appointments, and prescribed plan of care.

## 2024-01-11 ENCOUNTER — CLINICAL SUPPORT (OUTPATIENT)
Dept: VASCULAR MEDICINE | Facility: CLINIC | Age: 84
End: 2024-01-11
Payer: MEDICARE

## 2024-01-11 DIAGNOSIS — I77.89 ARTERIAL STEAL SYNDROME (CMS-HCC): ICD-10-CM

## 2024-01-11 DIAGNOSIS — R09.89 OTHER SPECIFIED SYMPTOMS AND SIGNS INVOLVING THE CIRCULATORY AND RESPIRATORY SYSTEMS: ICD-10-CM

## 2024-01-11 DIAGNOSIS — T82.848A PAIN DUE TO VASCULAR PROSTHETIC DEVICES, IMPLANTS AND GRAFTS, INITIAL ENCOUNTER (CMS-HCC): ICD-10-CM

## 2024-01-11 DIAGNOSIS — E11.22 CHRONIC KIDNEY DISEASE WITH END STAGE RENAL DISEASE ON DIALYSIS DUE TO TYPE 2 DIABETES MELLITUS (MULTI): ICD-10-CM

## 2024-01-11 DIAGNOSIS — Z99.2 CHRONIC KIDNEY DISEASE WITH END STAGE RENAL DISEASE ON DIALYSIS DUE TO TYPE 2 DIABETES MELLITUS (MULTI): ICD-10-CM

## 2024-01-11 DIAGNOSIS — N18.6 CHRONIC KIDNEY DISEASE WITH END STAGE RENAL DISEASE ON DIALYSIS DUE TO TYPE 2 DIABETES MELLITUS (MULTI): ICD-10-CM

## 2024-01-11 DIAGNOSIS — N18.6 END STAGE KIDNEY DISEASE (MULTI): ICD-10-CM

## 2024-01-11 PROCEDURE — 93922 UPR/L XTREMITY ART 2 LEVELS: CPT

## 2024-01-11 PROCEDURE — 93922 UPR/L XTREMITY ART 2 LEVELS: CPT | Performed by: INTERNAL MEDICINE

## 2024-01-11 PROCEDURE — 93930 UPPER EXTREMITY STUDY: CPT | Performed by: INTERNAL MEDICINE

## 2024-01-11 PROCEDURE — 93990 DOPPLER FLOW TESTING: CPT | Mod: 59

## 2024-01-11 PROCEDURE — 93990 DOPPLER FLOW TESTING: CPT | Performed by: INTERNAL MEDICINE

## 2024-01-12 ENCOUNTER — APPOINTMENT (OUTPATIENT)
Dept: CARDIOLOGY | Facility: CLINIC | Age: 84
End: 2024-01-12
Payer: MEDICARE

## 2024-01-15 ENCOUNTER — APPOINTMENT (OUTPATIENT)
Dept: VASCULAR SURGERY | Facility: CLINIC | Age: 84
End: 2024-01-15
Payer: MEDICARE

## 2024-01-16 ENCOUNTER — OFFICE VISIT (OUTPATIENT)
Dept: PRIMARY CARE | Facility: CLINIC | Age: 84
End: 2024-01-16
Payer: MEDICARE

## 2024-01-16 VITALS
TEMPERATURE: 97 F | DIASTOLIC BLOOD PRESSURE: 80 MMHG | HEIGHT: 62 IN | OXYGEN SATURATION: 98 % | SYSTOLIC BLOOD PRESSURE: 132 MMHG | BODY MASS INDEX: 30.18 KG/M2 | HEART RATE: 60 BPM | WEIGHT: 164 LBS

## 2024-01-16 DIAGNOSIS — H35.3230 BILATERAL EXUDATIVE AGE-RELATED MACULAR DEGENERATION, UNSPECIFIED STAGE (MULTI): ICD-10-CM

## 2024-01-16 DIAGNOSIS — I50.9 CHRONIC CONGESTIVE HEART FAILURE, UNSPECIFIED HEART FAILURE TYPE (MULTI): ICD-10-CM

## 2024-01-16 DIAGNOSIS — N18.6 ESRD (END STAGE RENAL DISEASE) ON DIALYSIS (MULTI): Primary | ICD-10-CM

## 2024-01-16 DIAGNOSIS — J44.1 CHRONIC OBSTRUCTIVE PULMONARY DISEASE WITH (ACUTE) EXACERBATION (MULTI): ICD-10-CM

## 2024-01-16 DIAGNOSIS — Z99.2 ESRD (END STAGE RENAL DISEASE) ON DIALYSIS (MULTI): Primary | ICD-10-CM

## 2024-01-16 DIAGNOSIS — D69.1 PLATELET DISORDER (MULTI): ICD-10-CM

## 2024-01-16 DIAGNOSIS — I48.20 CHRONIC ATRIAL FIBRILLATION (MULTI): ICD-10-CM

## 2024-01-16 PROCEDURE — 1160F RVW MEDS BY RX/DR IN RCRD: CPT | Performed by: INTERNAL MEDICINE

## 2024-01-16 PROCEDURE — 3079F DIAST BP 80-89 MM HG: CPT | Performed by: INTERNAL MEDICINE

## 2024-01-16 PROCEDURE — 1159F MED LIST DOCD IN RCRD: CPT | Performed by: INTERNAL MEDICINE

## 2024-01-16 PROCEDURE — 1126F AMNT PAIN NOTED NONE PRSNT: CPT | Performed by: INTERNAL MEDICINE

## 2024-01-16 PROCEDURE — 3075F SYST BP GE 130 - 139MM HG: CPT | Performed by: INTERNAL MEDICINE

## 2024-01-16 PROCEDURE — 4004F PT TOBACCO SCREEN RCVD TLK: CPT | Performed by: INTERNAL MEDICINE

## 2024-01-16 PROCEDURE — 99214 OFFICE O/P EST MOD 30 MIN: CPT | Performed by: INTERNAL MEDICINE

## 2024-01-16 ASSESSMENT — PATIENT HEALTH QUESTIONNAIRE - PHQ9
SUM OF ALL RESPONSES TO PHQ9 QUESTIONS 1 AND 2: 0
1. LITTLE INTEREST OR PLEASURE IN DOING THINGS: NOT AT ALL
2. FEELING DOWN, DEPRESSED OR HOPELESS: NOT AT ALL

## 2024-01-16 ASSESSMENT — ENCOUNTER SYMPTOMS
DEPRESSION: 0
OCCASIONAL FEELINGS OF UNSTEADINESS: 1
LOSS OF SENSATION IN FEET: 0

## 2024-01-16 ASSESSMENT — PAIN SCALES - GENERAL: PAINLEVEL: 0-NO PAIN

## 2024-01-17 ENCOUNTER — TELEPHONE (OUTPATIENT)
Dept: CARDIOLOGY | Facility: CLINIC | Age: 84
End: 2024-01-17
Payer: MEDICARE

## 2024-01-17 ENCOUNTER — APPOINTMENT (OUTPATIENT)
Dept: CARDIOLOGY | Facility: CLINIC | Age: 84
End: 2024-01-17
Payer: MEDICARE

## 2024-01-17 PROBLEM — I48.20 CHRONIC ATRIAL FIBRILLATION (MULTI): Status: RESOLVED | Noted: 2023-09-07 | Resolved: 2024-01-17

## 2024-01-17 NOTE — TELEPHONE ENCOUNTER
NS for appt today.  Called pt.  She is moving to [place] with her daughter on Saturday.  Daughter has made arrangements for pt for Dialysis and INR monitoring.  Will discharge pt from ACT clinic as of this date.
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
## 2024-01-29 ENCOUNTER — DOCUMENTATION (OUTPATIENT)
Dept: PRIMARY CARE | Facility: CLINIC | Age: 84
End: 2024-01-29
Payer: MEDICARE

## 2024-01-29 NOTE — PROGRESS NOTES
Patient has relocated to [place] with Daughter, Ida Mg effective 1/26/24. Based on this information, will close case at this time.

## 2024-01-30 PROBLEM — I99.8 VASCULAR INSUFFICIENCY: Status: RESOLVED | Noted: 2023-09-07 | Resolved: 2024-01-30

## 2024-01-31 ENCOUNTER — HOSPITAL ENCOUNTER (OUTPATIENT)
Dept: CARDIOLOGY | Facility: CLINIC | Age: 84
Discharge: HOME | End: 2024-01-31
Payer: MEDICARE

## 2024-01-31 DIAGNOSIS — Z95.0 PACEMAKER: ICD-10-CM

## 2024-01-31 DIAGNOSIS — I49.5 SINUS NODE DYSFUNCTION (MULTI): ICD-10-CM

## 2024-01-31 PROCEDURE — 93294 REM INTERROG EVL PM/LDLS PM: CPT | Performed by: INTERNAL MEDICINE

## 2024-01-31 PROCEDURE — 93296 REM INTERROG EVL PM/IDS: CPT

## 2024-02-01 ENCOUNTER — APPOINTMENT (OUTPATIENT)
Dept: PRIMARY CARE | Facility: CLINIC | Age: 84
End: 2024-02-01
Payer: MEDICARE

## 2024-02-19 ENCOUNTER — TELEPHONE (OUTPATIENT)
Dept: VASCULAR MEDICINE | Facility: CLINIC | Age: 84
End: 2024-02-19
Payer: MEDICARE

## 2024-02-22 DIAGNOSIS — I10 BENIGN ESSENTIAL HYPERTENSION: ICD-10-CM

## 2024-02-22 DIAGNOSIS — I48.0 PAROXYSMAL ATRIAL FIBRILLATION (MULTI): ICD-10-CM

## 2024-02-22 RX ORDER — AMIODARONE HYDROCHLORIDE 200 MG/1
200 TABLET ORAL DAILY
Qty: 90 TABLET | Refills: 3 | Status: SHIPPED | OUTPATIENT
Start: 2024-02-22 | End: 2024-04-19 | Stop reason: SDUPTHER

## 2024-02-22 RX ORDER — METOPROLOL TARTRATE 50 MG/1
50 TABLET ORAL 2 TIMES DAILY
Qty: 180 TABLET | Refills: 3 | Status: SHIPPED | OUTPATIENT
Start: 2024-02-22 | End: 2025-02-16

## 2024-02-22 NOTE — TELEPHONE ENCOUNTER
Pharmacy is requesting a refill on behalf of pt       Requested Prescriptions     Pending Prescriptions Disp Refills    amiodarone (Pacerone) 200 mg tablet 90 tablet 3     Sig: Take 1 tablet (200 mg) by mouth once daily.    metoprolol tartrate (Lopressor) 50 mg tablet 180 tablet 3     Sig: Take 1 tablet by mouth 2 times a day.

## 2024-02-26 DIAGNOSIS — I48.20 CHRONIC ATRIAL FIBRILLATION (MULTI): ICD-10-CM

## 2024-02-26 DIAGNOSIS — I10 BENIGN ESSENTIAL HYPERTENSION: ICD-10-CM

## 2024-02-26 DIAGNOSIS — E03.9 ACQUIRED HYPOTHYROIDISM: ICD-10-CM

## 2024-02-26 DIAGNOSIS — I48.0 PAROXYSMAL ATRIAL FIBRILLATION (MULTI): ICD-10-CM

## 2024-02-26 RX ORDER — AMIODARONE HYDROCHLORIDE 200 MG/1
200 TABLET ORAL DAILY
Qty: 90 TABLET | Refills: 1 | Status: CANCELLED | OUTPATIENT
Start: 2024-02-26 | End: 2024-08-24

## 2024-02-26 RX ORDER — LEVOTHYROXINE SODIUM 112 UG/1
112 TABLET ORAL
Qty: 90 TABLET | Refills: 1 | Status: CANCELLED | OUTPATIENT
Start: 2024-02-26 | End: 2024-08-24

## 2024-02-26 RX ORDER — METOPROLOL TARTRATE 50 MG/1
50 TABLET ORAL 2 TIMES DAILY
Qty: 180 TABLET | Refills: 3 | Status: CANCELLED | OUTPATIENT
Start: 2024-02-26 | End: 2025-02-20

## 2024-02-27 ENCOUNTER — APPOINTMENT (OUTPATIENT)
Dept: VASCULAR SURGERY | Facility: CLINIC | Age: 84
End: 2024-02-27
Payer: MEDICARE

## 2024-03-01 RX ORDER — METOPROLOL TARTRATE 50 MG/1
50 TABLET ORAL 2 TIMES DAILY
Qty: 180 TABLET | Refills: 3 | Status: CANCELLED | OUTPATIENT
Start: 2024-03-01 | End: 2025-02-24

## 2024-03-01 RX ORDER — WARFARIN 2.5 MG/1
2.5 TABLET ORAL NIGHTLY
Qty: 90 TABLET | Refills: 1 | Status: CANCELLED | OUTPATIENT
Start: 2024-03-01 | End: 2024-08-28

## 2024-03-01 RX ORDER — AMIODARONE HYDROCHLORIDE 200 MG/1
200 TABLET ORAL DAILY
Qty: 90 TABLET | Refills: 3 | Status: CANCELLED | OUTPATIENT
Start: 2024-03-01 | End: 2025-02-24

## 2024-03-01 NOTE — TELEPHONE ENCOUNTER
I have called the patient 3 times trying to get her mail order information so I can send her scripts. No voicemail, no answer.     Left a  with emergency contact to call with information for mail order

## 2024-03-08 NOTE — TELEPHONE ENCOUNTER
Unable to reach the patient or emergency contact re: [place]. Do not have a current address for the patient. Closing out the encounter as unable to get information to send it. Will send a letter to the local address in hopes of it being forwarded.

## 2024-03-27 ENCOUNTER — HOSPITAL ENCOUNTER (OUTPATIENT)
Dept: CARDIOLOGY | Facility: CLINIC | Age: 84
Discharge: HOME | End: 2024-03-27
Payer: MEDICARE

## 2024-03-27 DIAGNOSIS — I49.5 SINUS NODE DYSFUNCTION (MULTI): ICD-10-CM

## 2024-03-27 DIAGNOSIS — Z95.0 PACEMAKER: ICD-10-CM

## 2024-03-27 DIAGNOSIS — R55 SYNCOPE AND COLLAPSE: ICD-10-CM

## 2024-03-27 DIAGNOSIS — I48.0 PAROXYSMAL ATRIAL FIBRILLATION (MULTI): ICD-10-CM

## 2024-04-18 ENCOUNTER — TELEPHONE (OUTPATIENT)
Dept: PRIMARY CARE | Facility: CLINIC | Age: 84
End: 2024-04-18
Payer: MEDICARE

## 2024-04-19 DIAGNOSIS — I48.0 PAROXYSMAL ATRIAL FIBRILLATION (MULTI): ICD-10-CM

## 2024-04-19 RX ORDER — AMIODARONE HYDROCHLORIDE 200 MG/1
200 TABLET ORAL DAILY
Qty: 90 TABLET | Refills: 3 | Status: SHIPPED | OUTPATIENT
Start: 2024-04-19 | End: 2025-04-14

## 2024-04-19 NOTE — TELEPHONE ENCOUNTER
Pt is in need of a refill, she is currently out\ running low      Requested Prescriptions     Pending Prescriptions Disp Refills    amiodarone (Pacerone) 200 mg tablet 90 tablet 3     Sig: Take 1 tablet (200 mg) by mouth once daily.

## 2024-04-23 ENCOUNTER — HOSPITAL ENCOUNTER (OUTPATIENT)
Dept: CARDIOLOGY | Facility: CLINIC | Age: 84
Discharge: HOME | End: 2024-04-23
Payer: MEDICARE

## 2024-04-23 DIAGNOSIS — Z95.0 PACEMAKER: ICD-10-CM

## 2024-04-23 DIAGNOSIS — I49.5 SINUS NODE DYSFUNCTION (MULTI): ICD-10-CM

## 2024-04-23 PROCEDURE — 93296 REM INTERROG EVL PM/IDS: CPT

## 2024-04-23 NOTE — ADDENDUM NOTE
Encounter addended by: Adelita Troncoso RN on: 4/23/2024 9:58 AM   Actions taken: Imaging Exam ended

## 2024-05-10 ENCOUNTER — HOSPITAL ENCOUNTER (OUTPATIENT)
Dept: CARDIOLOGY | Facility: CLINIC | Age: 84
Discharge: HOME | End: 2024-05-10
Payer: MEDICARE

## 2024-05-10 DIAGNOSIS — I49.5 SSS (SICK SINUS SYNDROME) (MULTI): ICD-10-CM

## 2024-06-20 ENCOUNTER — HOSPITAL ENCOUNTER (OUTPATIENT)
Dept: CARDIOLOGY | Facility: CLINIC | Age: 84
Discharge: HOME | End: 2024-06-20
Payer: MEDICARE

## 2024-06-20 DIAGNOSIS — I50.9 CONGESTIVE HEART FAILURE, UNSPECIFIED HF CHRONICITY, UNSPECIFIED HEART FAILURE TYPE (MULTI): ICD-10-CM

## 2024-06-20 LAB — BODY SURFACE AREA: 1.8 M2

## 2024-07-24 ENCOUNTER — HOSPITAL ENCOUNTER (OUTPATIENT)
Dept: CARDIOLOGY | Facility: CLINIC | Age: 84
Discharge: HOME | End: 2024-07-24
Payer: MEDICARE

## 2024-07-24 DIAGNOSIS — I49.5 SSS (SICK SINUS SYNDROME) (MULTI): ICD-10-CM

## 2024-07-24 PROCEDURE — 93294 REM INTERROG EVL PM/LDLS PM: CPT | Performed by: INTERNAL MEDICINE

## 2024-07-24 PROCEDURE — 93296 REM INTERROG EVL PM/IDS: CPT

## 2024-08-28 ENCOUNTER — HOSPITAL ENCOUNTER (OUTPATIENT)
Dept: CARDIOLOGY | Facility: CLINIC | Age: 84
Discharge: HOME | End: 2024-08-28
Payer: MEDICARE

## 2024-08-28 DIAGNOSIS — I49.5 SICK SINUS SYNDROME (MULTI): ICD-10-CM

## 2024-08-28 DIAGNOSIS — Z95.0 PRESENCE OF CARDIAC PACEMAKER: ICD-10-CM

## 2024-09-27 ENCOUNTER — HOSPITAL ENCOUNTER (OUTPATIENT)
Dept: CARDIOLOGY | Facility: CLINIC | Age: 84
Discharge: HOME | End: 2024-09-27
Payer: MEDICARE

## 2024-09-27 DIAGNOSIS — I49.5 SICK SINUS SYNDROME (MULTI): ICD-10-CM

## 2024-09-27 DIAGNOSIS — Z95.0 PACEMAKER: ICD-10-CM

## 2025-01-09 ENCOUNTER — TELEPHONE (OUTPATIENT)
Dept: CARDIOLOGY | Facility: CLINIC | Age: 85
End: 2025-01-09
Payer: MEDICARE

## 2025-01-09 NOTE — TELEPHONE ENCOUNTER
Patient has pacemaker and not dependent.  Missed remote transmission and monitor not connect.  Called emergency contact to F/U with daughter regarding patient relocating and if she is following with another device clinic. Awaiting a return call.